# Patient Record
Sex: FEMALE | Race: WHITE | Employment: OTHER | URBAN - METROPOLITAN AREA
[De-identification: names, ages, dates, MRNs, and addresses within clinical notes are randomized per-mention and may not be internally consistent; named-entity substitution may affect disease eponyms.]

---

## 2021-07-15 ENCOUNTER — TRANSCRIBE ORDER (OUTPATIENT)
Dept: SCHEDULING | Age: 61
End: 2021-07-15

## 2021-07-15 DIAGNOSIS — S12.9XXA FRACTURE, CERVICAL VERTEBRA (HCC): Primary | ICD-10-CM

## 2021-07-15 DIAGNOSIS — S22.000A THORACIC COMPRESSION FRACTURE (HCC): Primary | ICD-10-CM

## 2021-07-15 DIAGNOSIS — S12.9XXA FRACTURE, CERVICAL VERTEBRA (HCC): ICD-10-CM

## 2021-07-25 ENCOUNTER — HOSPITAL ENCOUNTER (OUTPATIENT)
Dept: MRI IMAGING | Age: 61
Discharge: HOME | End: 2021-07-25
Attending: NEUROLOGICAL SURGERY
Payer: COMMERCIAL

## 2021-07-25 DIAGNOSIS — S22.000A THORACIC COMPRESSION FRACTURE (HCC): ICD-10-CM

## 2021-07-25 DIAGNOSIS — S12.9XXA FRACTURE, CERVICAL VERTEBRA (HCC): ICD-10-CM

## 2021-07-25 PROCEDURE — 72146 MRI CHEST SPINE W/O DYE: CPT

## 2021-07-25 PROCEDURE — 72141 MRI NECK SPINE W/O DYE: CPT

## 2021-09-07 ENCOUNTER — HOSPITAL ENCOUNTER (OUTPATIENT)
Dept: CT IMAGING | Age: 61
Discharge: HOME | End: 2021-09-07
Attending: NEUROLOGICAL SURGERY
Payer: COMMERCIAL

## 2021-09-07 DIAGNOSIS — S12.9XXA FRACTURE, CERVICAL VERTEBRA (HCC): ICD-10-CM

## 2021-09-07 PROCEDURE — 74011000636 HC RX REV CODE- 636: Performed by: NEUROLOGICAL SURGERY

## 2021-09-07 PROCEDURE — 70498 CT ANGIOGRAPHY NECK: CPT

## 2021-09-07 RX ADMIN — IOPAMIDOL 100 ML: 755 INJECTION, SOLUTION INTRAVENOUS at 15:32

## 2021-09-28 ENCOUNTER — TRANSCRIBE ORDER (OUTPATIENT)
Dept: SCHEDULING | Age: 61
End: 2021-09-28

## 2021-09-28 DIAGNOSIS — S12.9XXA CLOSED FRACTURE OF CERVICAL VERTEBRA WITHOUT SPINAL CORD INJURY (HCC): Primary | ICD-10-CM

## 2021-10-06 ENCOUNTER — HOSPITAL ENCOUNTER (OUTPATIENT)
Dept: CT IMAGING | Age: 61
Discharge: HOME | End: 2021-10-06
Attending: NEUROLOGICAL SURGERY

## 2021-10-06 DIAGNOSIS — S12.9XXA CLOSED FRACTURE OF CERVICAL VERTEBRA WITHOUT SPINAL CORD INJURY (HCC): ICD-10-CM

## 2021-10-06 PROCEDURE — 70496 CT ANGIOGRAPHY HEAD: CPT

## 2021-10-06 PROCEDURE — 74011000636 HC RX REV CODE- 636: Performed by: NEUROLOGICAL SURGERY

## 2021-10-06 RX ADMIN — IOPAMIDOL 100 ML: 755 INJECTION, SOLUTION INTRAVENOUS at 10:23

## 2021-10-08 ENCOUNTER — VIRTUAL VISIT (OUTPATIENT)
Dept: NEUROSURGERY | Age: 61
End: 2021-10-08
Payer: COMMERCIAL

## 2021-10-08 DIAGNOSIS — I67.1 CEREBRAL ANEURYSM: Primary | ICD-10-CM

## 2021-10-08 PROCEDURE — 99205 OFFICE O/P NEW HI 60 MIN: CPT | Performed by: RADIOLOGY

## 2021-10-08 RX ORDER — ATORVASTATIN CALCIUM 80 MG/1
80 TABLET, FILM COATED ORAL DAILY
COMMUNITY
Start: 2021-07-02

## 2021-10-08 RX ORDER — ASPIRIN 81 MG/1
81 TABLET ORAL DAILY
COMMUNITY
End: 2021-10-26 | Stop reason: DRUGHIGH

## 2021-10-08 RX ORDER — DULOXETIN HYDROCHLORIDE 20 MG/1
20 CAPSULE, DELAYED RELEASE ORAL DAILY
COMMUNITY
Start: 2021-08-14

## 2021-10-08 RX ORDER — PANTOPRAZOLE SODIUM 40 MG/1
40 TABLET, DELAYED RELEASE ORAL DAILY
COMMUNITY
Start: 2021-07-19

## 2021-10-08 RX ORDER — METOPROLOL SUCCINATE 25 MG/1
25 TABLET, EXTENDED RELEASE ORAL DAILY
COMMUNITY
Start: 2021-07-26
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
## 2021-10-08 NOTE — PATIENT INSTRUCTIONS
Learning About How Hospitals and Clinics Keep You Safe From COVID-19  Overview     Many hospitals and clinics now are treating people who are infected with COVID-19. So if you're in the hospital or clinic for any other reason, this may be an unsettling time. It's common to be concerned about becoming infected with the virus. But hospitals and clinics have policies to prevent the spread of infections. For example, doctors and nurses are trained to wash their hands before they treat you. Health care centers have stepped up these policies now. They are taking further steps to protect their patients. As long as HITPU-22 remains a public health problem, things are going to be different when you go to a health care facility. They may have new rules for your safety. These could include having you wear a cloth face cover, meeting you outside the clinic, and having you sit away from others in the waiting room. What are hospitals and clinics doing to keep you safe? Your care team is very aware of the threat of COVID-19. They are doing everything they can to keep you safe. Hospitals and clinics may have different policies. But in general, you may expect many of these measures:  · You will be screened for COVID-19. When you come to the hospital, you may have your temperature taken. You'll be asked about any symptoms, such as a fever, a cough, or shortness of breath. You may be asked if you've had contact with anyone who's been diagnosed with the virus. And you may be asked if you've traveled to any place that has had an outbreak. · The staff may try to do as much as possible outside the facility. For example, you may be asked to fill out paperwork online or in your car before you come inside. The person giving you a ride home may be asked to wait outside. These new rules to help protect you may make routine tasks take longer than usual.  · People who have COVID-19 are treated in a separate area.  Many hospitals and clinics have staff members who treat only these patients. This helps limit the spread of the infection. · Visitors may be limited. In some cases, no visitors are allowed. In others, only one healthy visitor is allowed. Children may be limited to having only one adult with them. You can connect with family and friends using your phone or computer. If you need something brought from home, such as glasses or a phone , find out where the item can be dropped off. · The hospital or clinic follows guidelines to prevent infection. These include:  ? Washing hands often. ? Disinfecting high-touch surfaces. ? Wearing face masks or other protective equipment. ? Making extra space for social distancing. What can you do to stay safe? We all have a role to play in keeping ourselves safe and preventing the spread of COVID-19. Here are some things you can do while you're receiving care. If you're in a hospital, stay in your room. This will limit your exposure to the virus. It may be boring, but it's the safest place for you. Wash your hands often. Use soap and water, and scrub for 20 seconds. Then rinse and dry them well. Always wash them after you use the bathroom, before you eat, and after you cough, sneeze, or blow your nose. If you can't wash your hands, use hand . Speak up if you have safety concerns. Don't be shy to correct health care workers if they aren't washing their hands properly, wearing face masks, or taking other precautions. These actions are vital to prevent the spread of infection. Try to be understanding. This is a stressful time for everyone, including your care team. They are doing their best to keep you safe and provide you with good care. Where can you learn more? Go to http://www.gray.com/  Enter A134 in the search box to learn more about \"Learning About How Hospitals and [facility] Safe From COVID-19. \"  Current as of: March 26, 2021               Content Version: 13.0  © 4481-2874 [employer]wise, [employer]. Care instructions adapted under license by [employer] (which disclaims liability or warranty for this information). If you have questions about a medical condition or this instruction, always ask your healthcare professional. [address] any warranty or liability for your use of this information.

## 2021-10-18 ENCOUNTER — PATIENT MESSAGE (OUTPATIENT)
Dept: NEUROSURGERY | Age: 61
End: 2021-10-18

## 2021-10-19 DIAGNOSIS — I67.1 CEREBRAL ANEURYSM: Primary | ICD-10-CM

## 2021-10-21 ENCOUNTER — TRANSCRIBE ORDER (OUTPATIENT)
Dept: REGISTRATION | Age: 61
End: 2021-10-21

## 2021-10-21 DIAGNOSIS — Z01.812 PRE-PROCEDURE LAB EXAM: Primary | ICD-10-CM

## 2021-10-26 ENCOUNTER — PATIENT MESSAGE (OUTPATIENT)
Dept: NEUROSURGERY | Age: 61
End: 2021-10-26

## 2021-10-26 DIAGNOSIS — I67.1 CEREBRAL ANEURYSM: Primary | ICD-10-CM

## 2021-10-26 RX ORDER — ASPIRIN 325 MG
325 TABLET ORAL DAILY
Qty: 30 TABLET | Refills: 5 | Status: SHIPPED | OUTPATIENT
Start: 2021-10-27 | End: 2022-04-18 | Stop reason: SDUPTHER

## 2021-10-26 RX ORDER — CLOPIDOGREL BISULFATE 75 MG/1
75 TABLET ORAL DAILY
Qty: 30 TABLET | Refills: 5 | Status: ON HOLD | OUTPATIENT
Start: 2021-10-27 | End: 2021-11-18 | Stop reason: SDUPTHER

## 2021-11-03 ENCOUNTER — HOSPITAL ENCOUNTER (OUTPATIENT)
Dept: PREADMISSION TESTING | Age: 61
Discharge: HOME | End: 2021-11-03
Payer: COMMERCIAL

## 2021-11-03 ENCOUNTER — HOSPITAL ENCOUNTER (OUTPATIENT)
Dept: GENERAL RADIOLOGY | Age: 61
Discharge: HOME | End: 2021-11-03
Attending: RADIOLOGY
Payer: COMMERCIAL

## 2021-11-03 ENCOUNTER — TELEPHONE (OUTPATIENT)
Dept: NEUROSURGERY | Age: 61
End: 2021-11-03

## 2021-11-03 VITALS
BODY MASS INDEX: 19.61 KG/M2 | DIASTOLIC BLOOD PRESSURE: 76 MMHG | WEIGHT: 114.86 LBS | HEART RATE: 69 BPM | HEIGHT: 64 IN | TEMPERATURE: 98.5 F | SYSTOLIC BLOOD PRESSURE: 131 MMHG | OXYGEN SATURATION: 100 %

## 2021-11-03 DIAGNOSIS — I67.1 CEREBRAL ANEURYSM: Primary | ICD-10-CM

## 2021-11-03 LAB
ABO + RH BLD: NORMAL
ALBUMIN SERPL-MCNC: 3.4 G/DL (ref 3.5–5)
ALBUMIN/GLOB SERPL: 1.1 {RATIO} (ref 1.1–2.2)
ALP SERPL-CCNC: 77 U/L (ref 45–117)
ALT SERPL-CCNC: 20 U/L (ref 12–78)
ANION GAP SERPL CALC-SCNC: 6 MMOL/L (ref 5–15)
ASPIRIN TEST, ASPIRN: 407 ARU
AST SERPL-CCNC: 14 U/L (ref 15–37)
BASOPHILS # BLD: 0.1 K/UL (ref 0–0.1)
BASOPHILS NFR BLD: 1 % (ref 0–1)
BILIRUB SERPL-MCNC: 0.5 MG/DL (ref 0.2–1)
BLOOD GROUP ANTIBODIES SERPL: NORMAL
BUN SERPL-MCNC: 7 MG/DL (ref 6–20)
BUN/CREAT SERPL: 10 (ref 12–20)
CALCIUM SERPL-MCNC: 9.1 MG/DL (ref 8.5–10.1)
CHLORIDE SERPL-SCNC: 107 MMOL/L (ref 97–108)
CO2 SERPL-SCNC: 27 MMOL/L (ref 21–32)
CREAT SERPL-MCNC: 0.68 MG/DL (ref 0.55–1.02)
DIFFERENTIAL METHOD BLD: ABNORMAL
EOSINOPHIL # BLD: 0.1 K/UL (ref 0–0.4)
EOSINOPHIL NFR BLD: 2 % (ref 0–7)
ERYTHROCYTE [DISTWIDTH] IN BLOOD BY AUTOMATED COUNT: 16.6 % (ref 11.5–14.5)
GLOBULIN SER CALC-MCNC: 3.2 G/DL (ref 2–4)
GLUCOSE SERPL-MCNC: 92 MG/DL (ref 65–100)
HCT VFR BLD AUTO: 38 % (ref 35–47)
HGB BLD-MCNC: 12.4 G/DL (ref 11.5–16)
IMM GRANULOCYTES # BLD AUTO: 0 K/UL (ref 0–0.04)
IMM GRANULOCYTES NFR BLD AUTO: 0 % (ref 0–0.5)
LYMPHOCYTES # BLD: 1.7 K/UL (ref 0.8–3.5)
LYMPHOCYTES NFR BLD: 24 % (ref 12–49)
MCH RBC QN AUTO: 29.3 PG (ref 26–34)
MCHC RBC AUTO-ENTMCNC: 32.6 G/DL (ref 30–36.5)
MCV RBC AUTO: 89.8 FL (ref 80–99)
MONOCYTES # BLD: 0.4 K/UL (ref 0–1)
MONOCYTES NFR BLD: 6 % (ref 5–13)
NEUTS SEG # BLD: 4.7 K/UL (ref 1.8–8)
NEUTS SEG NFR BLD: 67 % (ref 32–75)
NRBC # BLD: 0 K/UL (ref 0–0.01)
NRBC BLD-RTO: 0 PER 100 WBC
P2Y12 PLT RESPONSE,PPPR: 183 PRU (ref 194–418)
PLATELET # BLD AUTO: 365 K/UL (ref 150–400)
PMV BLD AUTO: 9.2 FL (ref 8.9–12.9)
POTASSIUM SERPL-SCNC: 3.7 MMOL/L (ref 3.5–5.1)
PROT SERPL-MCNC: 6.6 G/DL (ref 6.4–8.2)
RBC # BLD AUTO: 4.23 M/UL (ref 3.8–5.2)
SODIUM SERPL-SCNC: 140 MMOL/L (ref 136–145)
SPECIMEN EXP DATE BLD: NORMAL
WBC # BLD AUTO: 7 K/UL (ref 3.6–11)

## 2021-11-03 PROCEDURE — 71046 X-RAY EXAM CHEST 2 VIEWS: CPT

## 2021-11-03 PROCEDURE — 36415 COLL VENOUS BLD VENIPUNCTURE: CPT

## 2021-11-03 PROCEDURE — 80053 COMPREHEN METABOLIC PANEL: CPT

## 2021-11-03 PROCEDURE — 85576 BLOOD PLATELET AGGREGATION: CPT

## 2021-11-03 PROCEDURE — 93005 ELECTROCARDIOGRAM TRACING: CPT

## 2021-11-03 PROCEDURE — 85025 COMPLETE CBC W/AUTO DIFF WBC: CPT

## 2021-11-03 PROCEDURE — 86901 BLOOD TYPING SEROLOGIC RH(D): CPT

## 2021-11-03 NOTE — TELEPHONE ENCOUNTER
I received the result of patient's P2Y12 and aspirin tests. The aspirin test is therapeutic at 407 ARU, and the P2Y12 is borderline therapeutic at 183 PRU. I called the patient and advised her to Take Plavix 75 mg twice daily instead of once daily. We will recheck the P2Y12 test in 1 week. Patient expressed understanding and is in agreement with this plan.

## 2021-11-04 LAB
ATRIAL RATE: 60 BPM
CALCULATED P AXIS, ECG09: -10 DEGREES
CALCULATED R AXIS, ECG10: 67 DEGREES
CALCULATED T AXIS, ECG11: 11 DEGREES
DIAGNOSIS, 93000: NORMAL
P-R INTERVAL, ECG05: 124 MS
Q-T INTERVAL, ECG07: 440 MS
QRS DURATION, ECG06: 102 MS
QTC CALCULATION (BEZET), ECG08: 440 MS
VENTRICULAR RATE, ECG03: 60 BPM

## 2021-11-04 NOTE — PERIOP NOTES
BILLY Nurse Practitioner     Pre-Operative Chart Review/Assessment:    NEURO INTERVENTIONAL SURGERY                 Patient Name:  Cleo Bob                    MRN:   [id number]     Sex:   female                                       Age:   62 y.o.    :  1960       Today's Date:  2021     Date of PAT:   11/3/2021      Date of Surgery:    2021      Procedure(s):   Stent Assisted Coil Embolization     Surgeon:  Dr. Catie Aleman                    PLAN:       1)  PCP: Dr. Lia Edwards        2)  Clearances Required: Pt followed by Dr. Main([facility]). LOV 10/19/21. Condition stable. No new symptoms, changes to current regimen or pending testing. OV note and EKG faxed to [facility] office for scanning. EKG from [facility]. 3)  Sleep Apnea evaluation:  Not indicated.  MATTHEW Score 2.       4)  Additional Concerns: Current 0.5 PPD smoker, daily marijuana use for sleep aid, HTN, GERD, CAD s/p stent        5) Contrast Allergy: No       6) ASA: 407 on 11/3/21       7) P2Y12: 183 on 11/3/21    *ASA/P2Y12 routed to Johanna Bonner in [address] office on 21 @ 1010*                 Vital Signs:         Vitals:    21 1414   BP: 131/76   Pulse: 69   Temp: 98.5 °F (36.9 °C)   SpO2: 100%   Weight: 52.1 kg (114 lb 13.8 oz)   Height: 5' 4\" (1.626 m)   LMP: 10/25/2021            ____________________________________________  PAST MEDICAL HISTORY  Past Medical History:   Diagnosis Date    Aneurysm ([address]     CAD (coronary artery disease)     GERD (gastroesophageal reflux disease)     Hypertension       ____________________________________________  PAST SURGICAL HISTORY  Past Surgical History:   Procedure Laterality Date    HX APPENDECTOMY      HX HERNIA REPAIR      HX HIP REPLACEMENT Left     HX TONSILLECTOMY      HX TUBAL LIGATION      [place] CARDIAC SURG PROCEDURE UNLIST  2019    stent      ____________________________________________  HOME MEDICATIONS  Current Outpatient Medications   Medication Sig    clopidogreL (Plavix) 75 mg tab Take 1 Tablet by mouth daily.  aspirin (ASPIRIN) 325 mg tablet Take 1 Tablet by mouth daily.  pantoprazole (PROTONIX) 40 mg tablet Take 40 mg by mouth daily.  metoprolol succinate (TOPROL-XL) 25 mg XL tablet Take 25 mg by mouth daily.  DULoxetine (CYMBALTA) 20 mg capsule Take 20 mg by mouth daily.  atorvastatin (LIPITOR) 80 mg tablet Take 80 mg by mouth daily. No current facility-administered medications for this encounter.      ____________________________________________  ALLERGIES  Allergies   Allergen Reactions    Latex Hives     AND BLISTERS    Pcn [Penicillins] Unknown (comments)     When taking penicillin did you have any of the following:      Patient denies any of the following reactions from taking penicillin. No significant reaction to PCN    NO HOSPITALIZATION REQUIRED AS A CHILD.            ____________________________________________  SOCIAL HISTORY  Social History     Tobacco Use    Smoking status: Current Every Day Smoker     Packs/day: 0.50     Years: 25.00     Pack years: 12.50    Smokeless tobacco: Never Used   Substance Use Topics    Alcohol use: Yes     Alcohol/week: 2.0 standard drinks     Types: 2 Shots of liquor per week      ___________________________________________   Internal Administration   First Dose COVID-19, J&J, PF, 0.5 mL Dose  04/01/2021   Second Dose         Last COVID Lab SARS-CoV-2 ( )   Date Value   11/12/2021 Not detected      ____________________________________________    Labs:    Hospital Outpatient Visit on 11/03/2021   Component Date Value Ref Range Status    Aspirin test 11/03/2021 407  ARU Final    Comment: (NOTE)   The aspirin inhibitory effect on platelet aggregation is   demonstrated by a decrease in the VerifyNow Aspirin result compared   to the baseline value obtained prior to initiation of aspirin   therapy.                                                                 The nonmedicated reference range of > or = 550 ARU(Aspirin Reactive   Units) indicates absence of aspirin inhibition. Following consumption of aspirin, VerifyNow Aspirin test results   <550 ARU are associated with expected antiplatelet effect. Other platelet inhibiters, NSAIDS and low platelet counts may   interfere with the results.  WBC 11/03/2021 7.0  3.6 - 11.0 K/uL Final    RBC 11/03/2021 4.23  3.80 - 5.20 M/uL Final    HGB 11/03/2021 12.4  11.5 - 16.0 g/dL Final    HCT 11/03/2021 38.0  35.0 - 47.0 % Final    MCV 11/03/2021 89.8  80.0 - 99.0 FL Final    MCH 11/03/2021 29.3  26.0 - 34.0 PG Final    MCHC 11/03/2021 32.6  30.0 - 36.5 g/dL Final    RDW 11/03/2021 16.6* 11.5 - 14.5 % Final    PLATELET 52/96/5554 596  150 - 400 K/uL Final    MPV 11/03/2021 9.2  8.9 - 12.9 FL Final    NRBC 11/03/2021 0.0  0  WBC Final    ABSOLUTE NRBC 11/03/2021 0.00  0.00 - 0.01 K/uL Final    NEUTROPHILS 11/03/2021 67  32 - 75 % Final    LYMPHOCYTES 11/03/2021 24  12 - 49 % Final    MONOCYTES 11/03/2021 6  5 - 13 % Final    EOSINOPHILS 11/03/2021 2  0 - 7 % Final    BASOPHILS 11/03/2021 1  0 - 1 % Final    IMMATURE GRANULOCYTES 11/03/2021 0  0.0 - 0.5 % Final    ABS. NEUTROPHILS 11/03/2021 4.7  1.8 - 8.0 K/UL Final    ABS. LYMPHOCYTES 11/03/2021 1.7  0.8 - 3.5 K/UL Final    ABS. MONOCYTES 11/03/2021 0.4  0.0 - 1.0 K/UL Final    ABS. EOSINOPHILS 11/03/2021 0.1  0.0 - 0.4 K/UL Final    ABS. BASOPHILS 11/03/2021 0.1  0.0 - 0.1 K/UL Final    ABS. IMM. GRANS.  11/03/2021 0.0  0.00 - 0.04 K/UL Final    DF 11/03/2021 AUTOMATED    Final    Ventricular Rate 11/03/2021 60  BPM Preliminary    Atrial Rate 11/03/2021 60  BPM Preliminary    P-R Interval 11/03/2021 124  ms Preliminary    QRS Duration 11/03/2021 102  ms Preliminary    Q-T Interval 11/03/2021 440  ms Preliminary    QTC Calculation (Bezet) 11/03/2021 440  ms Preliminary    Calculated P Axis 11/03/2021 -10  degrees Preliminary    Calculated R Axis 11/03/2021 67  degrees Preliminary    Calculated T Axis 11/03/2021 11  degrees Preliminary    Diagnosis 11/03/2021    Preliminary                    Value:Normal sinus rhythm  Nonspecific ST abnormality  Abnormal ECG  No previous ECGs available      P2Y12 Plt response 11/03/2021 183* 194 - 418 PRU Final    Comment: (NOTE)  The clopidogrel inhibitory effect on P2Y12 receptors is demonstrated   by a decrease in the VerifyNow P2Y12 result compared to the baseline   value obtained prior to initiation of clopidogrel. The nonmedicated   reference range of 194-418 PRU (P2Y12 Reaction Units) indicates the   absence of P2Y12 inhibition. Following consumption of clopidogrel,   VerifyNow P2Y12 test results less than 194 are associated with   expected antiplatelet effect. Of note, results can be affected by   GPIIa IIIb inhibitors, inherited platelet disorders, low hematocrit   and or low platelet counts.  Crossmatch Expiration 11/03/2021 11/06/2021,2359   Final    ABO/Rh(D) 11/03/2021 A POSITIVE   Final    Antibody screen 11/03/2021 NEG   Final    Sodium 11/03/2021 140  136 - 145 mmol/L Final    Potassium 11/03/2021 3.7  3.5 - 5.1 mmol/L Final    Chloride 11/03/2021 107  97 - 108 mmol/L Final    CO2 11/03/2021 27  21 - 32 mmol/L Final    Anion gap 11/03/2021 6  5 - 15 mmol/L Final    Glucose 11/03/2021 92  65 - 100 mg/dL Final    BUN 11/03/2021 7  6 - 20 MG/DL Final    Creatinine 11/03/2021 0.68  0.55 - 1.02 MG/DL Final    BUN/Creatinine ratio 11/03/2021 10* 12 - 20   Final    GFR est AA 11/03/2021 >60  >60 ml/min/1.73m2 Final    GFR est non-AA 11/03/2021 >60  >60 ml/min/1.73m2 Final    Estimated GFR is calculated using the IDMS-traceable Modification of Diet in Renal Disease (MDRD) Study equation, reported for both  Americans (GFRAA) and non- Americans (GFRNA), and normalized to 1.73m2 body surface area. The physician must decide which value applies to the patient.     Calcium 11/03/2021 9.1  8.5 - 10.1 MG/DL Final    Bilirubin, total 11/03/2021 0.5  0.2 - 1.0 MG/DL Final    ALT (SGPT) 11/03/2021 20  12 - 78 U/L Final    AST (SGOT) 11/03/2021 14* 15 - 37 U/L Final    Alk. phosphatase 11/03/2021 77  45 - 117 U/L Final    Protein, total 11/03/2021 6.6  6.4 - 8.2 g/dL Final    Albumin 11/03/2021 3.4* 3.5 - 5.0 g/dL Final    Globulin 11/03/2021 3.2  2.0 - 4.0 g/dL Final    A-G Ratio 11/03/2021 1.1  1.1 - 2.2   Final       XR Results (most recent):  Results from Hospital Encounter encounter on 11/03/21    XR CHEST PA LAT    Narrative  EXAM: XR CHEST PA LAT    INDICATION: Preoperative examination prior to brain surgery. Coronary artery  disease, previous tobacco use, and Hypertension. COMPARISON: MRI thoracic spine on 7/25/2021    TECHNIQUE: PA and lateral chest digital view    FINDINGS: The cardiomediastinal and hilar contours are within normal limits. The  pulmonary vasculature is within normal limits. The lungs and pleural spaces are clear. Bones are osteopenic. T6 vertebral body  is partially compressed. No displaced rib fracture. Impression  Normal PA and lateral chest views. Unchanged T6 nonacute partial compression fracture. Skin:     Denies open wounds, cuts, sores, rashes or other areas of concern in PAT assessment.          Charis Schmidt NP

## 2021-11-08 ENCOUNTER — HOSPITAL ENCOUNTER (OUTPATIENT)
Dept: LAB | Age: 61
Discharge: HOME | End: 2021-11-08

## 2021-11-08 DIAGNOSIS — I67.1 CEREBRAL ANEURYSM: ICD-10-CM

## 2021-11-08 LAB
COMMENT, HOLDF: NORMAL
P2Y12 PLT RESPONSE,PPPR: 114 PRU (ref 194–418)
SAMPLES BEING HELD,HOLD: NORMAL

## 2021-11-09 ENCOUNTER — PATIENT MESSAGE (OUTPATIENT)
Dept: NEUROSURGERY | Age: 61
End: 2021-11-09

## 2021-11-09 NOTE — TELEPHONE ENCOUNTER
----- Message from Kristi Sepulveda MD sent at 11/8/2021  4:13 PM EST -----  Hi Terri Rodriguez,  Please call patient and let her know that her repeat platelet function test is perfect. She should CONTINUE taking plavix 75 mg TWICE daily and aspirin daily.  Thanks.  ----- Message -----  From: [facility] [place]  Sent: 11/8/2021   1:42 PM EST  To: Kristi Sepulveda MD

## 2021-11-09 NOTE — TELEPHONE ENCOUNTER
Message left for patient regarding lab results. Continue current dose as prescribed per provider. Message also sent via [employer].

## 2021-11-12 ENCOUNTER — HOSPITAL ENCOUNTER (OUTPATIENT)
Dept: PREADMISSION TESTING | Age: 61
Discharge: HOME | End: 2021-11-12
Attending: RADIOLOGY
Payer: COMMERCIAL

## 2021-11-12 DIAGNOSIS — Z01.812 PRE-PROCEDURE LAB EXAM: ICD-10-CM

## 2021-11-12 PROCEDURE — U0005 INFEC AGEN DETEC AMPLI PROBE: HCPCS

## 2021-11-13 LAB
SARS-COV-2, XPLCVT: NOT DETECTED
SOURCE, COVRS: NORMAL

## 2021-11-16 ENCOUNTER — DOCUMENTATION ONLY (OUTPATIENT)
Dept: NEUROSURGERY | Age: 61
End: 2021-11-16

## 2021-11-16 ENCOUNTER — TELEPHONE (OUTPATIENT)
Dept: NEUROSURGERY | Age: 61
End: 2021-11-16

## 2021-11-16 NOTE — TELEPHONE ENCOUNTER
Spoke to patient to confirm Stent assisted coil embolization tomorrow at [facility]. Arrival time 7:00 am at patient registration. Informed patient no eating or drinking after midnight and take morning medications with sips of water. Patient stated understanding.

## 2021-11-16 NOTE — PROGRESS NOTES
Procedure: Cerebral angiogram with stent-assisted coil embolization of Left MCA aneurysm  Sedation: General anesthesia  Access: Right CFA    Lab testing: None required (P2Y12 on 11/8/21 therapeutic at 114 PRU, Aspirin test on 11/3/21 therapeutic at 407 ARU, Cr & CBC normal on 11/3/21)  Medications: Patient should have taken normal home morning meds with small sips of water this morning (including Plavix 75 mg and aspirin 325 mg). Notes:  Patient has history of CAD and inferior STEMI in 2018, s/p PCI or RCA. EF 45-50% in 2019. EKG 11/3/21 showed NSR and nonspecific ST abnormality.

## 2021-11-17 ENCOUNTER — ANESTHESIA (OUTPATIENT)
Dept: INTERVENTIONAL RADIOLOGY/VASCULAR | Age: 61
DRG: 027 | End: 2021-11-17
Payer: COMMERCIAL

## 2021-11-17 ENCOUNTER — HOSPITAL ENCOUNTER (INPATIENT)
Dept: INTERVENTIONAL RADIOLOGY/VASCULAR | Age: 61
LOS: 1 days | Discharge: HOME | DRG: 027 | End: 2021-11-18
Attending: RADIOLOGY | Admitting: RADIOLOGY
Payer: COMMERCIAL

## 2021-11-17 ENCOUNTER — ANESTHESIA EVENT (OUTPATIENT)
Dept: INTERVENTIONAL RADIOLOGY/VASCULAR | Age: 61
DRG: 027 | End: 2021-11-17
Payer: COMMERCIAL

## 2021-11-17 DIAGNOSIS — I67.1 CEREBRAL ANEURYSM: ICD-10-CM

## 2021-11-17 LAB
ACT BLD: 125 SECS (ref 79–138)
ACT BLD: 197 SECS (ref 79–138)
ACT BLD: 230 SECS (ref 79–138)
ASPIRIN TEST, ASPIRN: 419 ARU
P2Y12 PLT RESPONSE,PPPR: 139 PRU (ref 194–418)
P2Y12 PLT RESPONSE,PPPR: 245 PRU (ref 194–418)

## 2021-11-17 PROCEDURE — C1887 CATHETER, GUIDING: HCPCS

## 2021-11-17 PROCEDURE — 85347 COAGULATION TIME ACTIVATED: CPT

## 2021-11-17 PROCEDURE — G0269 OCCLUSIVE DEVICE IN VEIN ART: HCPCS

## 2021-11-17 PROCEDURE — C1769 GUIDE WIRE: HCPCS

## 2021-11-17 PROCEDURE — 36224 PLACE CATH CAROTD ART: CPT

## 2021-11-17 PROCEDURE — 77030026438 HC STYL ET INTUB CARD -A: Performed by: ANESTHESIOLOGY

## 2021-11-17 PROCEDURE — 74011000250 HC RX REV CODE- 250: Performed by: NURSE PRACTITIONER

## 2021-11-17 PROCEDURE — 77030016715 HC CATH ANGI DX TMPO2 CARD -B

## 2021-11-17 PROCEDURE — 77030013797 HC KT TRNSDUC PRSSR EDWD -A

## 2021-11-17 PROCEDURE — 74011250637 HC RX REV CODE- 250/637: Performed by: RADIOLOGY

## 2021-11-17 PROCEDURE — B3181ZZ FLUOROSCOPY OF BILATERAL INTERNAL CAROTID ARTERIES USING LOW OSMOLAR CONTRAST: ICD-10-PCS | Performed by: RADIOLOGY

## 2021-11-17 PROCEDURE — C1889 IMPLANT/INSERT DEVICE, NOC: HCPCS

## 2021-11-17 PROCEDURE — 77030035304 HC CATH ANGI BENCHMARK PENU -G

## 2021-11-17 PROCEDURE — 03HY32Z INSERTION OF MONITORING DEVICE INTO UPPER ARTERY, PERCUTANEOUS APPROACH: ICD-10-PCS | Performed by: ANESTHESIOLOGY

## 2021-11-17 PROCEDURE — 74011000636 HC RX REV CODE- 636: Performed by: RADIOLOGY

## 2021-11-17 PROCEDURE — C1894 INTRO/SHEATH, NON-LASER: HCPCS

## 2021-11-17 PROCEDURE — 74011000250 HC RX REV CODE- 250: Performed by: NURSE ANESTHETIST, CERTIFIED REGISTERED

## 2021-11-17 PROCEDURE — 85576 BLOOD PLATELET AGGREGATION: CPT

## 2021-11-17 PROCEDURE — 77030012468 HC VLV BLEEDBK CNTRL ABBT -B

## 2021-11-17 PROCEDURE — 74011250636 HC RX REV CODE- 250/636: Performed by: NURSE ANESTHETIST, CERTIFIED REGISTERED

## 2021-11-17 PROCEDURE — 77030005402 HC CATH RAD ART LN KT TELE -B

## 2021-11-17 PROCEDURE — 77030020269 HC MISC IMPL

## 2021-11-17 PROCEDURE — 77030038563 HC TU ART PRESSR ICUM -Z

## 2021-11-17 PROCEDURE — 77030029288 HC HNDL INZONE DTCH STRY -C

## 2021-11-17 PROCEDURE — 74011000250 HC RX REV CODE- 250: Performed by: RADIOLOGY

## 2021-11-17 PROCEDURE — 36415 COLL VENOUS BLD VENIPUNCTURE: CPT

## 2021-11-17 PROCEDURE — 2709999900 HC NON-CHARGEABLE SUPPLY

## 2021-11-17 PROCEDURE — 77030008684 HC TU ET CUF COVD -B: Performed by: ANESTHESIOLOGY

## 2021-11-17 PROCEDURE — 74011250636 HC RX REV CODE- 250/636: Performed by: RADIOLOGY

## 2021-11-17 PROCEDURE — 74011250636 HC RX REV CODE- 250/636: Performed by: NURSE PRACTITIONER

## 2021-11-17 PROCEDURE — 03LG3DZ OCCLUSION OF INTRACRANIAL ARTERY WITH INTRALUMINAL DEVICE, PERCUTANEOUS APPROACH: ICD-10-PCS | Performed by: RADIOLOGY

## 2021-11-17 PROCEDURE — 76060000037 HC ANESTHESIA 3 TO 3.5 HR

## 2021-11-17 PROCEDURE — 74011250637 HC RX REV CODE- 250/637: Performed by: NURSE PRACTITIONER

## 2021-11-17 PROCEDURE — 77030041031 HC CTRLR DETACH SYS MICV -C

## 2021-11-17 PROCEDURE — C1760 CLOSURE DEV, VASC: HCPCS

## 2021-11-17 PROCEDURE — 65610000006 HC RM INTENSIVE CARE

## 2021-11-17 RX ORDER — NEOSTIGMINE METHYLSULFATE 1 MG/ML
INJECTION, SOLUTION INTRAVENOUS AS NEEDED
Status: DISCONTINUED | OUTPATIENT
Start: 2021-11-17 | End: 2021-11-17 | Stop reason: HOSPADM

## 2021-11-17 RX ORDER — METOPROLOL SUCCINATE 25 MG/1
25 TABLET, EXTENDED RELEASE ORAL DAILY
Status: DISCONTINUED | OUTPATIENT
Start: 2021-11-18 | End: 2021-11-18 | Stop reason: HOSPADM

## 2021-11-17 RX ORDER — MIDAZOLAM HYDROCHLORIDE 1 MG/ML
INJECTION, SOLUTION INTRAMUSCULAR; INTRAVENOUS
Status: COMPLETED
Start: 2021-11-17 | End: 2021-11-17

## 2021-11-17 RX ORDER — ACETAMINOPHEN 325 MG/1
650 TABLET ORAL
Status: DISCONTINUED | OUTPATIENT
Start: 2021-11-17 | End: 2021-11-18 | Stop reason: HOSPADM

## 2021-11-17 RX ORDER — OXYCODONE HYDROCHLORIDE 5 MG/1
5 TABLET ORAL
Status: DISCONTINUED | OUTPATIENT
Start: 2021-11-17 | End: 2021-11-18

## 2021-11-17 RX ORDER — ROCURONIUM BROMIDE 10 MG/ML
INJECTION, SOLUTION INTRAVENOUS AS NEEDED
Status: DISCONTINUED | OUTPATIENT
Start: 2021-11-17 | End: 2021-11-17 | Stop reason: HOSPADM

## 2021-11-17 RX ORDER — MIDAZOLAM HYDROCHLORIDE 1 MG/ML
INJECTION, SOLUTION INTRAMUSCULAR; INTRAVENOUS AS NEEDED
Status: DISCONTINUED | OUTPATIENT
Start: 2021-11-17 | End: 2021-11-17 | Stop reason: HOSPADM

## 2021-11-17 RX ORDER — LIDOCAINE HYDROCHLORIDE 20 MG/ML
20 INJECTION, SOLUTION INFILTRATION; PERINEURAL
Status: COMPLETED | OUTPATIENT
Start: 2021-11-17 | End: 2021-11-17

## 2021-11-17 RX ORDER — CLOPIDOGREL BISULFATE 75 MG/1
75 TABLET ORAL DAILY
Status: DISCONTINUED | OUTPATIENT
Start: 2021-11-17 | End: 2021-11-17

## 2021-11-17 RX ORDER — GLYCOPYRROLATE 0.2 MG/ML
INJECTION INTRAMUSCULAR; INTRAVENOUS AS NEEDED
Status: DISCONTINUED | OUTPATIENT
Start: 2021-11-17 | End: 2021-11-17 | Stop reason: HOSPADM

## 2021-11-17 RX ORDER — SODIUM CHLORIDE, SODIUM LACTATE, POTASSIUM CHLORIDE, CALCIUM CHLORIDE 600; 310; 30; 20 MG/100ML; MG/100ML; MG/100ML; MG/100ML
INJECTION, SOLUTION INTRAVENOUS
Status: DISCONTINUED | OUTPATIENT
Start: 2021-11-17 | End: 2021-11-17 | Stop reason: HOSPADM

## 2021-11-17 RX ORDER — ASPIRIN 325 MG
325 TABLET ORAL DAILY
Status: DISCONTINUED | OUTPATIENT
Start: 2021-11-17 | End: 2021-11-17

## 2021-11-17 RX ORDER — PANTOPRAZOLE SODIUM 40 MG/1
40 TABLET, DELAYED RELEASE ORAL DAILY
Status: DISCONTINUED | OUTPATIENT
Start: 2021-11-17 | End: 2021-11-17

## 2021-11-17 RX ORDER — PANTOPRAZOLE SODIUM 40 MG/1
40 TABLET, DELAYED RELEASE ORAL DAILY
Status: DISCONTINUED | OUTPATIENT
Start: 2021-11-18 | End: 2021-11-18 | Stop reason: HOSPADM

## 2021-11-17 RX ORDER — LIDOCAINE HYDROCHLORIDE 20 MG/ML
INJECTION, SOLUTION EPIDURAL; INFILTRATION; INTRACAUDAL; PERINEURAL AS NEEDED
Status: DISCONTINUED | OUTPATIENT
Start: 2021-11-17 | End: 2021-11-17 | Stop reason: HOSPADM

## 2021-11-17 RX ORDER — CLOPIDOGREL BISULFATE 75 MG/1
75 TABLET ORAL 2 TIMES DAILY
Status: DISCONTINUED | OUTPATIENT
Start: 2021-11-17 | End: 2021-11-18 | Stop reason: HOSPADM

## 2021-11-17 RX ORDER — SUCCINYLCHOLINE CHLORIDE 20 MG/ML
INJECTION INTRAMUSCULAR; INTRAVENOUS AS NEEDED
Status: DISCONTINUED | OUTPATIENT
Start: 2021-11-17 | End: 2021-11-17 | Stop reason: HOSPADM

## 2021-11-17 RX ORDER — CLOPIDOGREL BISULFATE 75 MG/1
150 TABLET ORAL
Status: COMPLETED | OUTPATIENT
Start: 2021-11-17 | End: 2021-11-17

## 2021-11-17 RX ORDER — SODIUM CHLORIDE, SODIUM LACTATE, POTASSIUM CHLORIDE, CALCIUM CHLORIDE 600; 310; 30; 20 MG/100ML; MG/100ML; MG/100ML; MG/100ML
50 INJECTION, SOLUTION INTRAVENOUS CONTINUOUS
Status: DISPENSED | OUTPATIENT
Start: 2021-11-17 | End: 2021-11-18

## 2021-11-17 RX ORDER — ASPIRIN 325 MG
325 TABLET ORAL DAILY
Status: DISCONTINUED | OUTPATIENT
Start: 2021-11-18 | End: 2021-11-18 | Stop reason: HOSPADM

## 2021-11-17 RX ORDER — HEPARIN SODIUM 1000 [USP'U]/ML
INJECTION, SOLUTION INTRAVENOUS; SUBCUTANEOUS AS NEEDED
Status: DISCONTINUED | OUTPATIENT
Start: 2021-11-17 | End: 2021-11-17 | Stop reason: HOSPADM

## 2021-11-17 RX ORDER — DULOXETIN HYDROCHLORIDE 20 MG/1
20 CAPSULE, DELAYED RELEASE ORAL DAILY
Status: DISCONTINUED | OUTPATIENT
Start: 2021-11-18 | End: 2021-11-18 | Stop reason: HOSPADM

## 2021-11-17 RX ORDER — ATORVASTATIN CALCIUM 40 MG/1
80 TABLET, FILM COATED ORAL DAILY
Status: DISCONTINUED | OUTPATIENT
Start: 2021-11-18 | End: 2021-11-18 | Stop reason: HOSPADM

## 2021-11-17 RX ORDER — ONDANSETRON 2 MG/ML
INJECTION INTRAMUSCULAR; INTRAVENOUS AS NEEDED
Status: DISCONTINUED | OUTPATIENT
Start: 2021-11-17 | End: 2021-11-17 | Stop reason: HOSPADM

## 2021-11-17 RX ORDER — PROPOFOL 10 MG/ML
INJECTION, EMULSION INTRAVENOUS AS NEEDED
Status: DISCONTINUED | OUTPATIENT
Start: 2021-11-17 | End: 2021-11-17 | Stop reason: HOSPADM

## 2021-11-17 RX ORDER — FENTANYL CITRATE 50 UG/ML
INJECTION, SOLUTION INTRAMUSCULAR; INTRAVENOUS AS NEEDED
Status: DISCONTINUED | OUTPATIENT
Start: 2021-11-17 | End: 2021-11-17 | Stop reason: HOSPADM

## 2021-11-17 RX ORDER — METOPROLOL SUCCINATE 25 MG/1
25 TABLET, EXTENDED RELEASE ORAL DAILY
Status: DISCONTINUED | OUTPATIENT
Start: 2021-11-17 | End: 2021-11-17

## 2021-11-17 RX ORDER — ATORVASTATIN CALCIUM 40 MG/1
80 TABLET, FILM COATED ORAL DAILY
Status: DISCONTINUED | OUTPATIENT
Start: 2021-11-17 | End: 2021-11-17

## 2021-11-17 RX ORDER — FENTANYL CITRATE 50 UG/ML
INJECTION, SOLUTION INTRAMUSCULAR; INTRAVENOUS
Status: COMPLETED
Start: 2021-11-17 | End: 2021-11-17

## 2021-11-17 RX ORDER — DULOXETIN HYDROCHLORIDE 20 MG/1
20 CAPSULE, DELAYED RELEASE ORAL DAILY
Status: DISCONTINUED | OUTPATIENT
Start: 2021-11-17 | End: 2021-11-17

## 2021-11-17 RX ADMIN — SODIUM CHLORIDE, POTASSIUM CHLORIDE, SODIUM LACTATE AND CALCIUM CHLORIDE 100 ML/HR: 600; 310; 30; 20 INJECTION, SOLUTION INTRAVENOUS at 11:52

## 2021-11-17 RX ADMIN — SODIUM CHLORIDE, POTASSIUM CHLORIDE, SODIUM LACTATE AND CALCIUM CHLORIDE: 600; 310; 30; 20 INJECTION, SOLUTION INTRAVENOUS at 08:34

## 2021-11-17 RX ADMIN — PHENYLEPHRINE HYDROCHLORIDE 10 MCG/MIN: 10 INJECTION INTRAVENOUS at 08:54

## 2021-11-17 RX ADMIN — ACETAMINOPHEN 650 MG: 325 TABLET ORAL at 13:40

## 2021-11-17 RX ADMIN — HEPARIN SODIUM 4000 UNITS: 5000 INJECTION, SOLUTION INTRAVENOUS; SUBCUTANEOUS at 08:11

## 2021-11-17 RX ADMIN — IOPAMIDOL 129 ML: 612 INJECTION, SOLUTION INTRAVENOUS at 11:03

## 2021-11-17 RX ADMIN — OXYCODONE 5 MG: 5 TABLET ORAL at 13:57

## 2021-11-17 RX ADMIN — CLOPIDOGREL BISULFATE 75 MG: 75 TABLET ORAL at 17:07

## 2021-11-17 RX ADMIN — NEOSTIGMINE METHYLSULFATE 3 MG: 1 INJECTION, SOLUTION INTRAVENOUS at 11:05

## 2021-11-17 RX ADMIN — PROPOFOL 120 MG: 10 INJECTION, EMULSION INTRAVENOUS at 08:37

## 2021-11-17 RX ADMIN — GLYCOPYRROLATE 0.1 MG: 0.2 INJECTION INTRAMUSCULAR; INTRAVENOUS at 10:03

## 2021-11-17 RX ADMIN — ONDANSETRON HYDROCHLORIDE 4 MG: 2 INJECTION, SOLUTION INTRAMUSCULAR; INTRAVENOUS at 11:05

## 2021-11-17 RX ADMIN — SUCCINYLCHOLINE CHLORIDE 120 MG: 20 INJECTION, SOLUTION INTRAMUSCULAR; INTRAVENOUS at 08:37

## 2021-11-17 RX ADMIN — SODIUM CHLORIDE, POTASSIUM CHLORIDE, SODIUM LACTATE AND CALCIUM CHLORIDE 100 ML/HR: 600; 310; 30; 20 INJECTION, SOLUTION INTRAVENOUS at 20:27

## 2021-11-17 RX ADMIN — HEPARIN SODIUM 4000 UNITS: 5000 INJECTION, SOLUTION INTRAVENOUS; SUBCUTANEOUS at 08:19

## 2021-11-17 RX ADMIN — HEPARIN SODIUM 4000 UNITS: 5000 INJECTION, SOLUTION INTRAVENOUS; SUBCUTANEOUS at 08:17

## 2021-11-17 RX ADMIN — PROPOFOL 30 MG: 10 INJECTION, EMULSION INTRAVENOUS at 08:43

## 2021-11-17 RX ADMIN — ROCURONIUM BROMIDE 20 MG: 10 INJECTION INTRAVENOUS at 09:15

## 2021-11-17 RX ADMIN — ROCURONIUM BROMIDE 10 MG: 10 INJECTION INTRAVENOUS at 08:37

## 2021-11-17 RX ADMIN — CLOPIDOGREL BISULFATE 150 MG: 75 TABLET ORAL at 13:39

## 2021-11-17 RX ADMIN — SODIUM CHLORIDE, POTASSIUM CHLORIDE, SODIUM LACTATE AND CALCIUM CHLORIDE: 600; 310; 30; 20 INJECTION, SOLUTION INTRAVENOUS at 09:15

## 2021-11-17 RX ADMIN — HEPARIN SODIUM 5000 UNITS: 1000 INJECTION, SOLUTION INTRAVENOUS; SUBCUTANEOUS at 09:53

## 2021-11-17 RX ADMIN — NICARDIPINE HYDROCHLORIDE 5 MG/HR: 25 INJECTION, SOLUTION INTRAVENOUS at 11:54

## 2021-11-17 RX ADMIN — ROCURONIUM BROMIDE 10 MG: 10 INJECTION INTRAVENOUS at 10:27

## 2021-11-17 RX ADMIN — FENTANYL CITRATE 50 MCG: 50 INJECTION, SOLUTION INTRAMUSCULAR; INTRAVENOUS at 08:37

## 2021-11-17 RX ADMIN — ROCURONIUM BROMIDE 10 MG: 10 INJECTION INTRAVENOUS at 09:50

## 2021-11-17 RX ADMIN — HEPARIN SODIUM 4000 UNITS: 5000 INJECTION, SOLUTION INTRAVENOUS; SUBCUTANEOUS at 08:18

## 2021-11-17 RX ADMIN — LIDOCAINE HYDROCHLORIDE 60 MG: 20 INJECTION, SOLUTION EPIDURAL; INFILTRATION; INTRACAUDAL; PERINEURAL at 08:37

## 2021-11-17 RX ADMIN — FENTANYL CITRATE 50 MCG: 50 INJECTION, SOLUTION INTRAMUSCULAR; INTRAVENOUS at 09:06

## 2021-11-17 RX ADMIN — LIDOCAINE HYDROCHLORIDE 20 MG: 20 INJECTION, SOLUTION INFILTRATION; PERINEURAL at 09:14

## 2021-11-17 RX ADMIN — ROCURONIUM BROMIDE 20 MG: 10 INJECTION INTRAVENOUS at 08:55

## 2021-11-17 RX ADMIN — HEPARIN SODIUM 4000 UNITS: 5000 INJECTION, SOLUTION INTRAVENOUS; SUBCUTANEOUS at 08:10

## 2021-11-17 RX ADMIN — GLYCOPYRROLATE 0.6 MG: 0.2 INJECTION INTRAMUSCULAR; INTRAVENOUS at 11:05

## 2021-11-17 RX ADMIN — HEPARIN SODIUM 4000 UNITS: 5000 INJECTION, SOLUTION INTRAVENOUS; SUBCUTANEOUS at 08:16

## 2021-11-17 RX ADMIN — OXYCODONE 5 MG: 5 TABLET ORAL at 22:59

## 2021-11-17 RX ADMIN — HEPARIN SODIUM 2000 UNITS: 1000 INJECTION, SOLUTION INTRAVENOUS; SUBCUTANEOUS at 10:06

## 2021-11-17 RX ADMIN — ACETAMINOPHEN 650 MG: 325 TABLET ORAL at 21:16

## 2021-11-17 RX ADMIN — GLYCOPYRROLATE 0.1 MG: 0.2 INJECTION INTRAMUSCULAR; INTRAVENOUS at 10:05

## 2021-11-17 RX ADMIN — MIDAZOLAM HYDROCHLORIDE 2 MG: 1 INJECTION, SOLUTION INTRAMUSCULAR; INTRAVENOUS at 08:34

## 2021-11-17 NOTE — ANESTHESIA POSTPROCEDURE EVALUATION
* No procedures listed *.    general    Anesthesia Post Evaluation        Patient participation: complete - patient participated  Level of consciousness: awake  Pain management: adequate  Airway patency: patent  Anesthetic complications: no  Cardiovascular status: hemodynamically stable  Respiratory status: acceptable  Hydration status: acceptable  Comments: The patient is ready for PACU discharge. Cady Madera DO                   Post anesthesia nausea and vomiting:  controlled      INITIAL Post-op Vital signs:   Vitals Value Taken Time   /66 11/17/21 1201   Temp 35.9 °C (96.6 °F) 11/17/21 1200   Pulse 60 11/17/21 1212   Resp 9 11/17/21 1212   SpO2 100 % 11/17/21 1212   Vitals shown include unvalidated device data.

## 2021-11-17 NOTE — BRIEF OP NOTE
NEUROINTERVENTIONAL SURGERY POST-PROCEDURE NOTE    PROCEDURE:  Diagnostic cerebral angiogram  Transarterial embolization, CNS: Coil embolization of left MCA aneurysm  Placement of arteriotomy closure device    VESSEL(S) STUDIED:  1. Right ICA  2. Left vertebral artery  3. Left ICA VESSEL(S) TREATED:  1. Left MCA        PRELIMINARY REPORT & DISPOSITION:   5 mm left MCA bifurcation aneurysm noted. Status post stent-assisted coil embolization of left MCA aneurysm using a 3 x 21 mm [place] stent extending from the left MCA inferior division to the left M1 segment and 8 detachable platinum coils, resulting in near-complete aneurysm occlusion. COMPLICATIONS:  None    FOLLOW-UP:  Admit to ICU  Check aspirin test and P2Y12 now  Q1 hr neurochecks  SBP   Continue DAPT DATE OF SERVICE:  11/17/2021 1:34 PM     ATTENDING SURGEON(S):  MD Kaden Torrez MD    ANESTHESIA:   GA    EBL: 10 cc    MEDICATIONS:   See nursing record    PUNCTURE SITE:  Right common femoral artery. Arteriotomy closed with [employer]. Flat with leg straight x 2 hours. Mimi Virk M.D.    Chris Mitchell    , Department of Radiology  [facility]
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
## 2021-11-17 NOTE — ANESTHESIA PROCEDURE NOTES
Arterial Line Placement    Performed by: Alexandria Davies MD  Authorized by: Rajiv Lema DO     Pre-Procedure  Indications:  Arterial pressure monitoring and blood sampling  Preanesthetic Checklist: patient identified, risks and benefits discussed, anesthesia consent, site marked, patient being monitored, timeout performed and patient being monitored      Procedure:   Prep:  ChloraPrep  Seldinger Technique?: Yes    Orientation:  Left  Location:  Radial artery  Catheter size:  20 G  Number of attempts:  1    Assessment:   Post-procedure:  Line secured and sterile dressing applied  Patient Tolerance:  Patient tolerated the procedure well with no immediate complications

## 2021-11-17 NOTE — ANESTHESIA PREPROCEDURE EVALUATION
Relevant Problems   No relevant active problems       Anesthetic History   No history of anesthetic complications            Review of Systems / Medical History  Patient summary reviewed, nursing notes reviewed and pertinent labs reviewed    Pulmonary  Within defined limits                 Neuro/Psych   Within defined limits           Cardiovascular    Hypertension          CAD    Exercise tolerance: >4 METS  Comments: EKG NSR   GI/Hepatic/Renal     GERD           Endo/Other  Within defined limits           Other Findings              Physical Exam    Airway  Mallampati: II  TM Distance: 4 - 6 cm  Neck ROM: normal range of motion   Mouth opening: Normal     Cardiovascular    Rhythm: regular  Rate: normal         Dental    Dentition: Full lower dentures and Full upper dentures     Pulmonary  Breath sounds clear to auscultation               Abdominal  Abdominal exam normal       Other Findings            Anesthetic Plan    ASA: 2  Anesthesia type: general    Monitoring Plan: Arterial line      Induction: Intravenous  Anesthetic plan and risks discussed with: Patient

## 2021-11-17 NOTE — PROGRESS NOTES
TRANSFER - IN REPORT:    Verbal report received from [address] RN(name) on [address]  being received from Angio(unit) for routine post - op      Report consisted of patients Situation, Background, Assessment and   Recommendations(SBAR). Information from the following report(s) SBAR, Kardex, ED Summary, Procedure Summary, Intake/Output, MAR, Recent Results, Med Rec Status, Cardiac Rhythm Sinus Bernadene Mettle, Alarm Parameters , Quality Measures and Dual Neuro Assessment was reviewed with the receiving nurse. Opportunity for questions and clarification was provided. Assessment completed upon patients arrival to unit and care assumed. 1135: Pt arrived to ICU 13.     1150: Dr. Renny Virk at bedside. 1500: Pt with oozing noted on R maico site, pressure held for 5 minutes, pt kept on bedrest, site marked. Kristie Ulrich NP notified of above. 1520: Kristie Ulrich NP at bedside. 1620: Dr. Renny Virk at bedside, groin evaluated, orders received to ambulate pt and remove guillen. Bedside shift change report given to Denise Pantoja (oncoming nurse) by Daisy Muir RN (offgoing nurse). Report included the following information SBAR, Kardex, ED Summary, Procedure Summary, Intake/Output, MAR, Recent Results, Med Rec Status, Cardiac Rhythm NSR, Alarm Parameters , Quality Measures and Dual Neuro Assessment.

## 2021-11-17 NOTE — PROGRESS NOTES
Pt arrives ambulatory to angio department accompanied by  for neuro interventional stent/coiling procedure. All assessments completed and consent was reviewed. Education given was regarding procedure, under anesthesia care, post-procedure care and  management/follow-up. Opportunity for questions was provided and all questions and concerns were addressed. 0830  To table under anesthesia care    [address],[address] 4 with transport to ICU 13. Report to [employer].
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
## 2021-11-18 ENCOUNTER — APPOINTMENT (OUTPATIENT)
Dept: CT IMAGING | Age: 61
DRG: 027 | End: 2021-11-18
Attending: NURSE PRACTITIONER
Payer: COMMERCIAL

## 2021-11-18 VITALS
DIASTOLIC BLOOD PRESSURE: 55 MMHG | HEIGHT: 64 IN | HEART RATE: 63 BPM | SYSTOLIC BLOOD PRESSURE: 118 MMHG | WEIGHT: 114 LBS | BODY MASS INDEX: 19.46 KG/M2 | RESPIRATION RATE: 15 BRPM | OXYGEN SATURATION: 99 % | TEMPERATURE: 98 F

## 2021-11-18 LAB
ANION GAP SERPL CALC-SCNC: 4 MMOL/L (ref 5–15)
BASOPHILS # BLD: 0 K/UL (ref 0–0.1)
BASOPHILS NFR BLD: 0 % (ref 0–1)
BUN SERPL-MCNC: 6 MG/DL (ref 6–20)
BUN/CREAT SERPL: 10 (ref 12–20)
CALCIUM SERPL-MCNC: 8.3 MG/DL (ref 8.5–10.1)
CHLORIDE SERPL-SCNC: 110 MMOL/L (ref 97–108)
CO2 SERPL-SCNC: 24 MMOL/L (ref 21–32)
CREAT SERPL-MCNC: 0.6 MG/DL (ref 0.55–1.02)
DIFFERENTIAL METHOD BLD: ABNORMAL
EOSINOPHIL # BLD: 0 K/UL (ref 0–0.4)
EOSINOPHIL NFR BLD: 0 % (ref 0–7)
ERYTHROCYTE [DISTWIDTH] IN BLOOD BY AUTOMATED COUNT: 16 % (ref 11.5–14.5)
GLUCOSE SERPL-MCNC: 143 MG/DL (ref 65–100)
HCT VFR BLD AUTO: 32.1 % (ref 35–47)
HGB BLD-MCNC: 10.4 G/DL (ref 11.5–16)
IMM GRANULOCYTES # BLD AUTO: 0 K/UL (ref 0–0.04)
IMM GRANULOCYTES NFR BLD AUTO: 0 % (ref 0–0.5)
LYMPHOCYTES # BLD: 1.1 K/UL (ref 0.8–3.5)
LYMPHOCYTES NFR BLD: 13 % (ref 12–49)
MCH RBC QN AUTO: 27.8 PG (ref 26–34)
MCHC RBC AUTO-ENTMCNC: 32.4 G/DL (ref 30–36.5)
MCV RBC AUTO: 85.8 FL (ref 80–99)
MONOCYTES # BLD: 0.4 K/UL (ref 0–1)
MONOCYTES NFR BLD: 4 % (ref 5–13)
NEUTS SEG # BLD: 7 K/UL (ref 1.8–8)
NEUTS SEG NFR BLD: 83 % (ref 32–75)
NRBC # BLD: 0 K/UL (ref 0–0.01)
NRBC BLD-RTO: 0 PER 100 WBC
PLATELET # BLD AUTO: 270 K/UL (ref 150–400)
PMV BLD AUTO: 9.1 FL (ref 8.9–12.9)
POTASSIUM SERPL-SCNC: 3.3 MMOL/L (ref 3.5–5.1)
POTASSIUM SERPL-SCNC: 4.3 MMOL/L (ref 3.5–5.1)
RBC # BLD AUTO: 3.74 M/UL (ref 3.8–5.2)
SODIUM SERPL-SCNC: 138 MMOL/L (ref 136–145)
WBC # BLD AUTO: 8.5 K/UL (ref 3.6–11)

## 2021-11-18 PROCEDURE — 74011250637 HC RX REV CODE- 250/637: Performed by: NURSE PRACTITIONER

## 2021-11-18 PROCEDURE — 85025 COMPLETE CBC W/AUTO DIFF WBC: CPT

## 2021-11-18 PROCEDURE — 99239 HOSP IP/OBS DSCHRG MGMT >30: CPT | Performed by: RADIOLOGY

## 2021-11-18 PROCEDURE — 80048 BASIC METABOLIC PNL TOTAL CA: CPT

## 2021-11-18 PROCEDURE — 70450 CT HEAD/BRAIN W/O DYE: CPT

## 2021-11-18 PROCEDURE — 74011000250 HC RX REV CODE- 250: Performed by: NURSE PRACTITIONER

## 2021-11-18 PROCEDURE — 84132 ASSAY OF SERUM POTASSIUM: CPT

## 2021-11-18 PROCEDURE — 74011250636 HC RX REV CODE- 250/636: Performed by: NURSE PRACTITIONER

## 2021-11-18 PROCEDURE — 36415 COLL VENOUS BLD VENIPUNCTURE: CPT

## 2021-11-18 RX ORDER — POTASSIUM CHLORIDE 750 MG/1
20 TABLET, FILM COATED, EXTENDED RELEASE ORAL
Status: COMPLETED | OUTPATIENT
Start: 2021-11-18 | End: 2021-11-18

## 2021-11-18 RX ORDER — CLOPIDOGREL BISULFATE 75 MG/1
75 TABLET ORAL 2 TIMES DAILY
Qty: 30 TABLET | Refills: 5 | Status: SHIPPED | OUTPATIENT
Start: 2021-11-18 | End: 2021-11-18 | Stop reason: SDUPTHER

## 2021-11-18 RX ORDER — CLOPIDOGREL BISULFATE 75 MG/1
75 TABLET ORAL 2 TIMES DAILY
Qty: 30 TABLET | Refills: 5 | Status: SHIPPED | OUTPATIENT
Start: 2021-11-18

## 2021-11-18 RX ADMIN — CLOPIDOGREL BISULFATE 75 MG: 75 TABLET ORAL at 08:35

## 2021-11-18 RX ADMIN — NICARDIPINE HYDROCHLORIDE 5 MG/HR: 25 INJECTION, SOLUTION INTRAVENOUS at 03:49

## 2021-11-18 RX ADMIN — POTASSIUM CHLORIDE 20 MEQ: 750 TABLET, FILM COATED, EXTENDED RELEASE ORAL at 03:46

## 2021-11-18 RX ADMIN — DULOXETINE HYDROCHLORIDE 20 MG: 20 CAPSULE, DELAYED RELEASE ORAL at 08:36

## 2021-11-18 RX ADMIN — ATORVASTATIN CALCIUM 80 MG: 40 TABLET, FILM COATED ORAL at 08:35

## 2021-11-18 RX ADMIN — ASPIRIN 325 MG ORAL TABLET 325 MG: 325 PILL ORAL at 08:36

## 2021-11-18 RX ADMIN — PANTOPRAZOLE SODIUM 40 MG: 40 TABLET, DELAYED RELEASE ORAL at 08:36

## 2021-11-18 RX ADMIN — POTASSIUM CHLORIDE 20 MEQ: 750 TABLET, FILM COATED, EXTENDED RELEASE ORAL at 06:23

## 2021-11-18 RX ADMIN — METOPROLOL SUCCINATE 25 MG: 25 TABLET, EXTENDED RELEASE ORAL at 08:35

## 2021-11-18 NOTE — PROGRESS NOTES
Neurocritical Care Brief Progress Note:  60-year-old female s/p transarterial coil embolization of the left MCA aneurysm by Dr. Ez Carter on  11/17/21. Physical Exam:  Gen: NAD, calm, cooperative  Neuro: A&Ox4. Follows commands. Speech clear. Affect normal. PERRL, 3 mm bilaterally. Blinks to threat. No disconjugate gaze present. EOMI. Face symmetric. Palate symmetric. Tongue midline. Korina spontaneously. Strength 5/5 in UE and LE BL. Negative drift. Bulk and tone normal. No involuntary movements. Gait deferred. Skin: Warm, dry, color appropriate for ethnicity. Groin arteriotomy site soft and non-tender on palpation, dressing with mild ooze noted with no active bleeding or drainage noted. Positive pedal pulses bilaterally. PLAN:   -Q hourly neuro checks  -SBP goal   -Continue DAPT. P2Y12 139 and aspirin level 419  -Continue statin    Addendum: 3:20 am Potassium low at 3.3. Will give 20 mg q 2 hours x 2 doses. Recheck potassium later this am. Decreased IVF down to 50 ml/hr.      Mango Jolly NP  Neurocritical Care Nurse Practitioner  [phone]

## 2021-11-18 NOTE — PROGRESS NOTES
0730: Bedside and Verbal shift change report given to Liz SOSA RN (oncoming nurse) by Bette Ferrara RN (offgoing nurse). Report included the following information SBAR, Kardex, Intake/Output, MAR, Recent Results and Cardiac Rhythm NSR.

## 2021-11-18 NOTE — PROGRESS NOTES
1930: Bedside shift change report given to Bonifacio Fernandez RN (oncoming nurse) by Burton Tsai RN (offgoing nurse). Report included the following information SBAR, Kardex, Intake/Output, MAR, Recent Results, Cardiac Rhythm NSR, Alarm Parameters  and Dual Neuro Assessment . 2000: Pt A/O x 4. Pupils equal and brisk.  equal and strong, spont movement in all extremities. No complaints of pain. No further oozing noted on groin site; borders marked. No hematoma present. VSS.     0012: Pt to CT. Tolerated well. Transported in wheelchair with monitor and RN x 1.     0034: Pt back on unit. 0315: K+ 3.3. Orders received from Marguerite Munoz NP for oral potassium x 2 doses and recheck potassium at 1000.    0345: SBP >140. Cardene restarted at 5.     0730: Bedside shift change report given to EARNESTINE Hill (oncoming nurse) by Bonifacio Fernandez RN (offgoing nurse). Report included the following information SBAR, Kardex, Intake/Output, MAR, Recent Results, Cardiac Rhythm NSR, Alarm Parameters  and Dual Neuro Assessment .

## 2021-11-18 NOTE — DISCHARGE INSTRUCTIONS
Neurointerventional Surgery Discharge Summary        DATE OF ADMISSION: 11/17/2021     DATE OF DISCHARGE: 11/18/2021     ADMITTING PROVIDER: Zechariah Girard MD    DISCHARGING PHYSICIAN: Lorina Lefort, NP    OFFICE NUMBER: [phone] , you can reach the on call physician 24/7 even during non-business hours     PROCEDURES/SURGERIES: Procedure(s)     * No procedures listed *     [address]:   Cerebral aneurysm [I67.1]  Cerebral aneurysm, nonruptured [I67.1]    Plan to discharge home today, ambulatory and in stable condition. Physical Exam:  GENERAL: alert, cooperative, no distress, appears stated age  EXTREMITIES:  extremities normal, atraumatic, no cyanosis or edema  SKIN: Warm, dry and appropriate for ethnicity. Right groin arteriotomy sight has slight bruising but is soft and without drainage. Dressing removed prior to discharge. Neurologic Exam:  Mental Status:             Alert and oriented x 4. Appropriate affect, mood and behavior. Language:                   Normal fluency, repetition, comprehension and naming. Cranial Nerves:           Pupils equal, round and reactive to light. Visual fields full to confrontation. Extraocular movements intact. Facial sensation intact V1 - V3. Full facial strength, no asymmetry. Hearing intact bilaterally. No dysarthria. Tongue protrudes to midline, palate elevates symmetrically. Shoulder shrug 5/5 bilaterally. Motor:                          No pronator drift. Bulk and tone normal.                                       5/5 power in all extremities proximally and distally.                                       No involuntary movements. Sensation:                   Sensation intact throughout to light touch     Reflexes:                     Deferred     Coordination & Gait:   Normal. FTN and HTS intact. DISCHARGE DIAGNOSES:   Hospital Problems as of 11/18/2021 Date Reviewed: 11/17/2021          Codes Class Noted - Resolved POA    Cerebral aneurysm, nonruptured ICD-10-CM: I67.1  ICD-9-CM: 437.3  11/17/2021 - Present Yes              FOLLOW UP APPOINTMENTS:     Follow-up Information     Follow up With Specialties Details Why Contact Info    Erin Jenkins MD Endovascular Surgical Neuroradiology On 11/22/2021 Please follow up with Dr. Salena Melton for your virtual appointment at [address] [place] [address]  [phone]      [facility] [address] [place] [address]  [address] [address] [phone]             ADDITIONAL CARE RECOMMENDATIONS:   Patient to avoid tub baths, swimming or hot tubs for two weeks. May take showers. Please call 911 and proceed to emergency room for any future difficulties with  balance, vision, weakness in the face, arm or leg, or speech difficulties. DIET: Normal diet     ACTIVITY: Do not lift anything over 10 lbs for two weeks. Try to limit going up and down stairs as much as possible. Rest and hydrate. May resume all physical activities as tolerated after 2 weeks. WOUND CARE: Monitor for signs and sx of infection including fever, expanding inflammation and discolored drainage. Report any changes in temperature in affected extremity, numbness, weakness or hematoma. If you experience any increased bruising or bleeding at your groin puncture site either outside or under the skin please apply direct, firm pressure for 20 minutes. Keep track of the bruising at the groin site by marking it with a pen or marker.  If the bruising continues to be dark purple or blue in color OR is expanding , please call our office to let the on call doctor know. We have someone on call 24/7. DISCHARGE MEDICATIONS:      1. Continue Aspirin 325 mg daily  2. Continue Plavix 75 mg BID    Avoid NSAIDS including (Advil, motrin, ibuprofen, Celebrex etc ) while you are taking aspirin and plavix/Brilinta, you may take Tylenol for pain. Greater than 40 minutes were spent with the patient on counseling and coordination of care.       Signed:  Luis Blas NP  Neurointerventional Surgery

## 2021-11-18 NOTE — DISCHARGE SUMMARY
Neurointerventional Surgery Discharge Summary        DATE OF ADMISSION: 11/17/2021     DATE OF DISCHARGE: 11/18/2021     ADMITTING PROVIDER: Franklin Ribeiro MD    DISCHARGING PHYSICIAN: Dion Patel NP    OFFICE NUMBER: [phone] , you can reach the on call physician 24/7 even during non-business hours     PROCEDURES/SURGERIES: Procedure(s)     * No procedures listed *     [address]:   Cerebral aneurysm [I67.1]  Cerebral aneurysm, nonruptured [I67.1]    Plan to discharge home today, ambulatory and in stable condition. Physical Exam:  GENERAL: alert, cooperative, no distress, appears stated age  EXTREMITIES:  extremities normal, atraumatic, no cyanosis or edema  SKIN: Warm, dry and appropriate for ethnicity. Right groin arteriotomy sight has slight bruising but is soft and without drainage. Dressing removed prior to discharge. Neurologic Exam:  Mental Status:             Alert and oriented x 4. Appropriate affect, mood and behavior. Language:                   Normal fluency, repetition, comprehension and naming. Cranial Nerves:           Pupils equal, round and reactive to light. Visual fields full to confrontation. Extraocular movements intact. Facial sensation intact V1 - V3. Full facial strength, no asymmetry. Hearing intact bilaterally. No dysarthria. Tongue protrudes to midline, palate elevates symmetrically. Shoulder shrug 5/5 bilaterally. Motor:                          No pronator drift. Bulk and tone normal.                                       5/5 power in all extremities proximally and distally.                                       No involuntary movements. Sensation:                   Sensation intact throughout to light touch     Reflexes:                     Deferred     Coordination & Gait:   Normal. FTN and HTS intact. DISCHARGE DIAGNOSES:   Hospital Problems as of 11/18/2021 Date Reviewed: 11/17/2021          Codes Class Noted - Resolved POA    Cerebral aneurysm, nonruptured ICD-10-CM: I67.1  ICD-9-CM: 437.3  11/17/2021 - Present Yes              FOLLOW UP APPOINTMENTS:     Follow-up Information     Follow up With Specialties Details Why Contact Info    Pako Neal MD Endovascular Surgical Neuroradiology On 11/22/2021 Please follow up with Dr. Jhonny Gautam for your virtual appointment at [address] [place] [address]  [phone]      Jenny Trujillo, [address]  [address] [address] [phone]             ADDITIONAL CARE RECOMMENDATIONS:   Patient to avoid tub baths, swimming or hot tubs for two weeks. May take showers. Please call 911 and proceed to emergency room for any future difficulties with  balance, vision, weakness in the face, arm or leg, or speech difficulties. DIET: Normal diet     ACTIVITY: Do not lift anything over 10 lbs for two weeks. Try to limit going up and down stairs as much as possible. Rest and hydrate. May resume all physical activities as tolerated after 2 weeks. WOUND CARE: Monitor for signs and sx of infection including fever, expanding inflammation and discolored drainage. Report any changes in temperature in affected extremity, numbness, weakness or hematoma. If you experience any increased bruising or bleeding at your groin puncture site either outside or under the skin please apply direct, firm pressure for 20 minutes. Keep track of the bruising at the groin site by marking it with a pen or marker.  If the bruising continues to be dark purple or blue in color OR is expanding , please call our office to let the on call doctor know. We have someone on call 24/7. DISCHARGE MEDICATIONS:      1. Continue Aspirin 325 mg daily  2. Continue Plavix 75 mg BID    Avoid NSAIDS including (Advil, motrin, ibuprofen, Celebrex etc ) while you are taking aspirin and plavix/Brilinta, you may take Tylenol for pain. Greater than 40 minutes were spent with the patient on counseling and coordination of care.       Signed:  Luis Blas NP  Neurointerventional Surgery

## 2021-12-10 ENCOUNTER — PATIENT MESSAGE (OUTPATIENT)
Dept: NEUROSURGERY | Age: 61
End: 2021-12-10

## 2021-12-16 ENCOUNTER — TELEPHONE (OUTPATIENT)
Dept: NEUROSURGERY | Age: 61
End: 2021-12-16

## 2021-12-16 NOTE — TELEPHONE ENCOUNTER
Spoke to pharmacist at [facility] regarding refill on plavix. Pharmacist stated plavix needed a prior authorization due to patient taking 2 tabs daily. Request faxed to office today from pharmacy. Prior authorization form with office note faxed to [employer] for prior authorization.

## 2022-01-27 ENCOUNTER — TELEPHONE (OUTPATIENT)
Dept: NEUROSURGERY | Age: 62
End: 2022-01-27

## 2022-03-18 PROBLEM — I67.1 CEREBRAL ANEURYSM, NONRUPTURED: Status: ACTIVE | Noted: 2021-11-17

## 2022-03-19 PROBLEM — I67.1 CEREBRAL ANEURYSM: Status: ACTIVE | Noted: 2021-10-08

## 2022-04-18 DIAGNOSIS — I67.1 CEREBRAL ANEURYSM: ICD-10-CM

## 2022-04-18 RX ORDER — ASPIRIN 325 MG
325 TABLET ORAL DAILY
Qty: 30 TABLET | Refills: 5 | Status: SHIPPED | OUTPATIENT
Start: 2022-04-18 | End: 2022-09-14 | Stop reason: SDUPTHER

## 2022-04-26 ENCOUNTER — TELEPHONE (OUTPATIENT)
Dept: NEUROSURGERY | Age: 62
End: 2022-04-26

## 2022-05-24 ENCOUNTER — TELEPHONE (OUTPATIENT)
Dept: NEUROSURGERY | Age: 62
End: 2022-05-24

## 2022-09-14 DIAGNOSIS — I67.1 CEREBRAL ANEURYSM: ICD-10-CM

## 2022-09-14 RX ORDER — ASPIRIN 325 MG
325 TABLET ORAL DAILY
Qty: 30 TABLET | Refills: 5 | Status: SHIPPED | OUTPATIENT
Start: 2022-09-14

## 2022-10-27 ENCOUNTER — TELEPHONE (OUTPATIENT)
Dept: NEUROSURGERY | Age: 62
End: 2022-10-27

## 2023-03-10 DIAGNOSIS — I67.1 CEREBRAL ANEURYSM: ICD-10-CM

## 2023-03-10 RX ORDER — ASPIRIN 325 MG
325 TABLET ORAL DAILY
Qty: 30 TABLET | Refills: 2 | Status: SHIPPED | OUTPATIENT
Start: 2023-03-10

## 2023-03-14 ENCOUNTER — TELEPHONE (OUTPATIENT)
Dept: NEUROSURGERY | Age: 63
End: 2023-03-14

## 2023-08-25 PROBLEM — D50.9 IRON DEFICIENCY ANEMIA, UNSPECIFIED: Status: ACTIVE | Noted: 2023-08-25

## 2023-08-29 ENCOUNTER — HOSPITAL ENCOUNTER (OUTPATIENT)
Facility: HOSPITAL | Age: 63
Setting detail: INFUSION SERIES
End: 2023-08-29
Payer: COMMERCIAL

## 2023-08-29 VITALS
TEMPERATURE: 98 F | SYSTOLIC BLOOD PRESSURE: 106 MMHG | RESPIRATION RATE: 18 BRPM | DIASTOLIC BLOOD PRESSURE: 75 MMHG | HEART RATE: 71 BPM

## 2023-08-29 DIAGNOSIS — D50.9 IRON DEFICIENCY ANEMIA, UNSPECIFIED IRON DEFICIENCY ANEMIA TYPE: Primary | ICD-10-CM

## 2023-08-29 PROCEDURE — 6360000002 HC RX W HCPCS: Performed by: INTERNAL MEDICINE

## 2023-08-29 PROCEDURE — 2580000003 HC RX 258: Performed by: INTERNAL MEDICINE

## 2023-08-29 PROCEDURE — 96365 THER/PROPH/DIAG IV INF INIT: CPT

## 2023-08-29 RX ORDER — SODIUM CHLORIDE 9 MG/ML
5-250 INJECTION, SOLUTION INTRAVENOUS PRN
Status: CANCELLED | OUTPATIENT
Start: 2023-09-05

## 2023-08-29 RX ORDER — SODIUM CHLORIDE 9 MG/ML
5-250 INJECTION, SOLUTION INTRAVENOUS PRN
Status: DISCONTINUED | OUTPATIENT
Start: 2023-08-29 | End: 2023-08-30 | Stop reason: HOSPADM

## 2023-08-29 RX ADMIN — FERUMOXYTOL 510 MG: 510 INJECTION INTRAVENOUS at 14:20

## 2023-08-29 RX ADMIN — SODIUM CHLORIDE 25 ML/HR: 9 INJECTION, SOLUTION INTRAVENOUS at 13:56

## 2023-09-06 ENCOUNTER — HOSPITAL ENCOUNTER (OUTPATIENT)
Facility: HOSPITAL | Age: 63
Setting detail: INFUSION SERIES
End: 2023-09-06
Payer: COMMERCIAL

## 2023-09-06 VITALS
RESPIRATION RATE: 18 BRPM | HEART RATE: 77 BPM | TEMPERATURE: 98.7 F | DIASTOLIC BLOOD PRESSURE: 67 MMHG | SYSTOLIC BLOOD PRESSURE: 104 MMHG

## 2023-09-06 DIAGNOSIS — D50.9 IRON DEFICIENCY ANEMIA, UNSPECIFIED IRON DEFICIENCY ANEMIA TYPE: Primary | ICD-10-CM

## 2023-09-06 PROCEDURE — 6360000002 HC RX W HCPCS: Performed by: INTERNAL MEDICINE

## 2023-09-06 PROCEDURE — 96365 THER/PROPH/DIAG IV INF INIT: CPT

## 2023-09-06 PROCEDURE — 2580000003 HC RX 258: Performed by: INTERNAL MEDICINE

## 2023-09-06 RX ORDER — SODIUM CHLORIDE 9 MG/ML
5-250 INJECTION, SOLUTION INTRAVENOUS PRN
Status: DISCONTINUED | OUTPATIENT
Start: 2023-09-06 | End: 2023-09-07 | Stop reason: HOSPADM

## 2023-09-06 RX ORDER — SODIUM CHLORIDE 9 MG/ML
5-250 INJECTION, SOLUTION INTRAVENOUS PRN
Status: CANCELLED | OUTPATIENT
Start: 2023-09-12

## 2023-09-06 RX ADMIN — SODIUM CHLORIDE 25 ML/HR: 9 INJECTION, SOLUTION INTRAVENOUS at 13:18

## 2023-09-06 RX ADMIN — FERUMOXYTOL 510 MG: 510 INJECTION INTRAVENOUS at 13:47

## 2023-09-06 ASSESSMENT — PAIN SCALES - GENERAL
PAINLEVEL_OUTOF10: 0
PAINLEVEL_OUTOF10: 0

## 2024-07-01 ENCOUNTER — HOSPITAL ENCOUNTER (OUTPATIENT)
Facility: HOSPITAL | Age: 64
Setting detail: OUTPATIENT SURGERY
Discharge: HOME | End: 2024-07-01
Attending: INTERNAL MEDICINE | Admitting: INTERNAL MEDICINE
Payer: COMMERCIAL

## 2024-07-01 ENCOUNTER — ANESTHESIA (OUTPATIENT)
Facility: HOSPITAL | Age: 64
End: 2024-07-01
Payer: COMMERCIAL

## 2024-07-01 ENCOUNTER — ANESTHESIA EVENT (OUTPATIENT)
Facility: HOSPITAL | Age: 64
End: 2024-07-01
Payer: COMMERCIAL

## 2024-07-01 VITALS
HEIGHT: 64 IN | SYSTOLIC BLOOD PRESSURE: 136 MMHG | TEMPERATURE: 97.7 F | WEIGHT: 86.7 LBS | BODY MASS INDEX: 14.8 KG/M2 | RESPIRATION RATE: 10 BRPM | HEART RATE: 66 BPM | OXYGEN SATURATION: 98 % | DIASTOLIC BLOOD PRESSURE: 75 MMHG

## 2024-07-01 PROCEDURE — 7100000010 HC PHASE II RECOVERY - FIRST 15 MIN: Performed by: INTERNAL MEDICINE

## 2024-07-01 PROCEDURE — 2580000003 HC RX 258: Performed by: INTERNAL MEDICINE

## 2024-07-01 PROCEDURE — 7100000011 HC PHASE II RECOVERY - ADDTL 15 MIN: Performed by: INTERNAL MEDICINE

## 2024-07-01 PROCEDURE — 88305 TISSUE EXAM BY PATHOLOGIST: CPT

## 2024-07-01 PROCEDURE — 3700000001 HC ADD 15 MINUTES (ANESTHESIA): Performed by: INTERNAL MEDICINE

## 2024-07-01 PROCEDURE — 6360000002 HC RX W HCPCS: Performed by: REGISTERED NURSE

## 2024-07-01 PROCEDURE — 2709999900 HC NON-CHARGEABLE SUPPLY: Performed by: INTERNAL MEDICINE

## 2024-07-01 PROCEDURE — 3600007502: Performed by: INTERNAL MEDICINE

## 2024-07-01 PROCEDURE — 3600007512: Performed by: INTERNAL MEDICINE

## 2024-07-01 PROCEDURE — 3700000000 HC ANESTHESIA ATTENDED CARE: Performed by: INTERNAL MEDICINE

## 2024-07-01 PROCEDURE — 88360 TUMOR IMMUNOHISTOCHEM/MANUAL: CPT

## 2024-07-01 PROCEDURE — 2500000003 HC RX 250 WO HCPCS: Performed by: REGISTERED NURSE

## 2024-07-01 RX ORDER — GLYCOPYRROLATE 0.2 MG/ML
INJECTION INTRAMUSCULAR; INTRAVENOUS PRN
Status: DISCONTINUED | OUTPATIENT
Start: 2024-07-01 | End: 2024-07-01 | Stop reason: SDUPTHER

## 2024-07-01 RX ORDER — SIMETHICONE 40MG/0.6ML
40 SUSPENSION, DROPS(FINAL DOSAGE FORM)(ML) ORAL AS NEEDED
Status: DISCONTINUED | OUTPATIENT
Start: 2024-07-01 | End: 2024-07-01 | Stop reason: HOSPADM

## 2024-07-01 RX ORDER — SODIUM CHLORIDE 9 MG/ML
INJECTION, SOLUTION INTRAVENOUS CONTINUOUS
Status: DISCONTINUED | OUTPATIENT
Start: 2024-07-01 | End: 2024-07-01 | Stop reason: HOSPADM

## 2024-07-01 RX ORDER — SODIUM CHLORIDE 0.9 % (FLUSH) 0.9 %
5-40 SYRINGE (ML) INJECTION PRN
Status: DISCONTINUED | OUTPATIENT
Start: 2024-07-01 | End: 2024-07-01 | Stop reason: HOSPADM

## 2024-07-01 RX ADMIN — PROPOFOL 50 MG: 10 INJECTION, EMULSION INTRAVENOUS at 15:20

## 2024-07-01 RX ADMIN — PROPOFOL 20 MG: 10 INJECTION, EMULSION INTRAVENOUS at 15:16

## 2024-07-01 RX ADMIN — PROPOFOL 50 MG: 10 INJECTION, EMULSION INTRAVENOUS at 15:37

## 2024-07-01 RX ADMIN — PROPOFOL 80 MG: 10 INJECTION, EMULSION INTRAVENOUS at 15:14

## 2024-07-01 RX ADMIN — LIDOCAINE HYDROCHLORIDE 40 MG: 20 INJECTION, SOLUTION EPIDURAL; INFILTRATION; INTRACAUDAL; PERINEURAL at 15:14

## 2024-07-01 RX ADMIN — SODIUM CHLORIDE: 9 INJECTION, SOLUTION INTRAVENOUS at 15:09

## 2024-07-01 RX ADMIN — PROPOFOL 50 MG: 10 INJECTION, EMULSION INTRAVENOUS at 15:29

## 2024-07-01 RX ADMIN — PROPOFOL 50 MG: 10 INJECTION, EMULSION INTRAVENOUS at 15:52

## 2024-07-01 RX ADMIN — PROPOFOL 50 MG: 10 INJECTION, EMULSION INTRAVENOUS at 15:33

## 2024-07-01 RX ADMIN — PROPOFOL 50 MG: 10 INJECTION, EMULSION INTRAVENOUS at 15:25

## 2024-07-01 RX ADMIN — PROPOFOL 50 MG: 10 INJECTION, EMULSION INTRAVENOUS at 15:47

## 2024-07-01 RX ADMIN — GLYCOPYRROLATE 0.1 MG: 0.2 INJECTION, SOLUTION INTRAMUSCULAR; INTRAVENOUS at 15:14

## 2024-07-01 RX ADMIN — PROPOFOL 50 MG: 10 INJECTION, EMULSION INTRAVENOUS at 15:42

## 2024-07-01 ASSESSMENT — PAIN - FUNCTIONAL ASSESSMENT: PAIN_FUNCTIONAL_ASSESSMENT: 0-10

## 2024-07-01 ASSESSMENT — LIFESTYLE VARIABLES: SMOKING_STATUS: 1

## 2024-07-01 NOTE — DISCHARGE INSTRUCTIONS
Shauna Houston  [id number]  1960    COLON/EGD DISCHARGE INSTRUCTIONS  Discomfort:  Redness at IV site- apply warm compress to area; if redness or soreness persist- contact your physician  There may be a slight amount of blood passed from the rectum  Gaseous discomfort- walking, belching will help relieve any discomfort  Sore throat- throat lozenges or warm salt water gargle  You may not operate a vehicle for 12 hours  You may not engage in an occupation involving machinery or appliances for rest of today  You may not drink alcoholic beverages for at least 12 hours  Avoid making any critical decisions for at least 24 hour  DIET:   Low residue diet   - however -  remember your colon is empty and a heavy meal will produce gas.   Avoid these foods:  vegetables, fried / greasy foods, carbonated drinks for today.    MEDICATIONS:      Regarding Aspirin or Nonsteroidal medications, please see below.    ACTIVITY:  You may resume your normal daily activities it is recommended that you spend the remainder of the day resting -  avoid any strenuous activity.    CALL M.D.  ANY SIGN OF:  Increasing pain, nausea, vomiting  Abdominal distension (swelling)  New increased bleeding (oral or rectal)  Fever (chills)  Pain in chest area  Bloody discharge from nose or mouth  Shortness of breath    The use of some over-the-counter pain medication may lead to bleeding after biopsies or other procedures you may have had done.  Tylenol (also called acetaminophen) is safe to take and will not lead to bleeding.  For the next 7 days use only Tylenol for pain.  Do not take any medicines containing ibuprofen, naproxen or aspirin during that time.      Impressions:  EGD:  Erosive gastritis, 3 cm hiatal hernia, gastric and duodenal bx taken  Colonoscopy: Ulcerated, 5-7 cm mass in the rectosigmoid colon, 10 cm from the anus, biopsies taken to confirm neoplasia, Tattoo placed DISTAL to the lesion, 6-8 mm descending colon polyp s/p cold snare

## 2024-07-01 NOTE — PROGRESS NOTES
ARRIVAL INFORMATION:  Verified patient name and date of birth, scheduled procedure, and informed consent.     : Lee contact number: [phone]  Physician and staff can share information with the .     Belongings with patient include:  Clothing,None    GI FOCUSED ASSESSMENT:  Neuro: Awake, alert, oriented x4  Respiratory: even and unlabored   GI: soft and non-distended  EKG Rhythm: normal sinus rhythm    Education:Reviewed general discharge instructions and  information.     The risks and benefits of the bite block have been explained to patient.  Patient verbalizes understanding.

## 2024-07-01 NOTE — OP NOTE
hiatal hernia. Hill valve grade II.  Stomach: There is erosive gastritis in the antrum, otherwise normal. Cold forceps biopsies were taken from antrum, incisura, lesser and greater curvature to r/o H pylori.   Duodenum/jejunum: Normal mucosa and vascular pattern, biopsied cold forceps to r/o celiac disease        ----------Colonoscopy-----------    Procedure in Detail:  After obtaining informed consent, positioning of the patient in the left lateral decubitus position, and conduction of a pre-procedure pause or \"time out\" the endoscope was introduced into the anus and advanced to the terminal ileum.  The quality of the colonic preparation was good.  A careful inspection was made as the colonoscope was withdrawn, findings and interventions are described below.    Findings:  -Normal terminal ileum  -There is an ulcerated near half circumferential mass in the rectosigmoid colon, 10 cm from the anus, friable, easily bleeding, approximately 5-7 cm in length, able to pass pediatric colon scope; multiple cold forceps biopsies were taken to confirm neoplasia. Spot ink Tattoo is placed distal to the lesion. There is some patchy erythema in the sigmoid colon, proximal to the lesion site.  -There is a single sessile 6 to 8 mm polyp in the descending colon, removed by cold snare and retrieved completely.  -Retroflexion in the rectum was not performed.  Internal hemorrhoids noted    ------------------------------  Specimens:    See above    Complications:   None; patient tolerated the procedure well.    Impressions:  EGD:  Erosive gastritis, 3 cm hiatal hernia, gastric and duodenal bx taken  Colonoscopy: Ulcerated, 5-7 cm mass in the rectosigmoid colon, 10 cm from the anus, biopsies taken to confirm neoplasia, Tattoo placed DISTAL to the lesion, 6-8 mm descending colon polyp s/p cold snare resection      Recommendations:   - Await pathology results  - Resume low residue diet  - Avoid NSAIDs  - Start PPI 40 mg QD  - Refer to  colorectal surgeon once rectosigmoid lesion biopsy confirm neoplasia    Thank you for entrusting me with this patient's care.  Please do not hesitate to contact me with any questions or if I can be of assistance with any of your other patients' GI needs.    Signed By: Mike Horvath MD                        July 1, 2024    Surgical assistant none.  Implants none unless specified.

## 2024-07-01 NOTE — ANESTHESIA POSTPROCEDURE EVALUATION
Department of Anesthesiology  Postprocedure Note    Patient: Shauna Houston  MRN: [id number]  YOB: 1960  Date of evaluation: 7/1/2024    Procedure Summary       Date: 07/01/24 Room / Location: [facility] ENDO 01 / [facility] ENDOSCOPY    Anesthesia Start: 1507 Anesthesia Stop: 1604    Procedures:       ESOPHAGOGASTRODUODENOSCOPY (Upper GI Region)      COLONOSCOPY DIAGNOSTIC (Lower GI Region) Diagnosis:       Occult blood positive stool      (Occult blood positive stool [R19.5])    Surgeons: Mike Horvath MD Responsible Provider: Kevin Olivares MD    Anesthesia Type: MAC ASA Status: 3            Anesthesia Type: MAC    Crissy Phase I: Crissy Score: 10    Crissy Phase II: Crissy Score: 10    Anesthesia Post Evaluation    Patient location during evaluation: bedside  Patient participation: complete - patient participated  Level of consciousness: awake  Pain score: 0  Airway patency: patent  Nausea & Vomiting: no nausea and no vomiting  Cardiovascular status: hemodynamically stable  Respiratory status: acceptable  Hydration status: euvolemic  Pain management: adequate    No notable events documented.

## 2024-07-01 NOTE — ANESTHESIA PRE PROCEDURE
any of the following reactions from taking penicillin.    No significant reaction to PCN    NO HOSPITALIZATION REQUIRED AS A CHILD.       Problem List:    Patient Active Problem List   Diagnosis Code   • Cerebral aneurysm, nonruptured I67.1   • Cerebral aneurysm I67.1   • Iron deficiency anemia, unspecified D50.9       Past Medical History:        Diagnosis Date   • Anemia    • Aneurysm (HCC) 2021   • CAD (coronary artery disease)    • GERD (gastroesophageal reflux disease)    • Hypertension        Past Surgical History:        Procedure Laterality Date   • APPENDECTOMY     • HERNIA REPAIR     • [place] UNLISTED PROCEDURE CARDIAC SURGERY  Sept 2019    stent   • TONSILLECTOMY     • TOTAL HIP ARTHROPLASTY Left    • TUBAL LIGATION         Social History:    Social History     Tobacco Use   • Smoking status: Every Day     Current packs/day: 0.50     Types: Cigarettes   • Smokeless tobacco: Never   Substance Use Topics   • Alcohol use: Yes     Alcohol/week: 2.0 standard drinks of alcohol                                Ready to quit: Not Answered  Counseling given: Not Answered      Vital Signs (Current): There were no vitals filed for this visit.                                           BP Readings from Last 3 Encounters:   09/06/23 104/67   08/29/23 106/75       NPO Status:                                                                                 BMI:   Wt Readings from Last 3 Encounters:   No data found for Wt     There is no height or weight on file to calculate BMI.    CBC:   Lab Results   Component Value Date/Time    WBC 8.5 11/18/2021 01:55 AM    RBC 3.74 11/18/2021 01:55 AM    HGB 10.4 11/18/2021 01:55 AM    HCT 32.1 11/18/2021 01:55 AM    MCV 85.8 11/18/2021 01:55 AM    RDW 16.0 11/18/2021 01:55 AM     11/18/2021 01:55 AM       CMP:   Lab Results   Component Value Date/Time     11/18/2021 01:55 AM    K 4.3 11/18/2021 10:06 AM     11/18/2021 01:55 AM    CO2 24 11/18/2021 01:55 AM    BUN 6

## 2024-07-01 NOTE — H&P
[facility]  Outpatient History and Physical    Patient: Shauna ADAM Houston    Physician: Mike Horvath MD    Vital Signs: Blood pressure 129/76, pulse 73, temperature 97.8 °F (36.6 °C), resp. rate 20, height 1.626 m (5' 4\"), weight 39.3 kg (86 lb 11.2 oz), SpO2 100 %.    Allergies:   Allergies   Allergen Reactions    Latex Hives     AND BLISTERS    Penicillins      Other reaction(s): Unknown (comments)  When taking penicillin did you have any of the following:      Patient denies any of the following reactions from taking penicillin.    No significant reaction to PCN    NO HOSPITALIZATION REQUIRED AS A CHILD.       Chief Complaint: Unintentional weight loss, intermittent diarrhea, iron def anemia, no prior scopes    History of Present Illness: Unintentional weight loss, intermittent diarrhea, iron def anemia, no prior scopes    Indication for Procedure: Unintentional weight loss, intermittent diarrhea, iron def anemia, no prior scopes    History:  Past Medical History:   Diagnosis Date    Anemia     Aneurysm (HCC) 2021    CAD (coronary artery disease)     GERD (gastroesophageal reflux disease)     Hypertension       Past Surgical History:   Procedure Laterality Date    APPENDECTOMY      HERNIA REPAIR      [place] UNLISTED PROCEDURE CARDIAC SURGERY  Sept 2019    stent    TONSILLECTOMY      TOTAL HIP ARTHROPLASTY Left     TUBAL LIGATION        Social History     Socioeconomic History    Marital status:      Spouse name: None    Number of children: None    Years of education: None    Highest education level: None   Tobacco Use    Smoking status: Every Day     Current packs/day: 0.50     Types: Cigarettes    Smokeless tobacco: Never   Substance and Sexual Activity    Alcohol use: Yes     Alcohol/week: 2.0 standard drinks of alcohol    Drug use: Yes     Types: Marijuana (Weed)      Family History   Problem Relation Age of Onset    Dementia Mother     No Known Problems Father     No

## 2024-07-12 ENCOUNTER — TRANSCRIBE ORDERS (OUTPATIENT)
Facility: HOSPITAL | Age: 64
End: 2024-07-12

## 2024-07-12 DIAGNOSIS — C19 PRIMARY ADENOCARCINOMA OF RECTOSIGMOID JUNCTION (HCC): Primary | ICD-10-CM

## 2024-07-22 ENCOUNTER — CLINICAL DOCUMENTATION (OUTPATIENT)
Facility: HOSPITAL | Age: 64
End: 2024-07-22

## 2024-07-22 ENCOUNTER — HOSPITAL ENCOUNTER (OUTPATIENT)
Facility: HOSPITAL | Age: 64
Discharge: HOME | End: 2024-07-25
Attending: INTERNAL MEDICINE
Payer: COMMERCIAL

## 2024-07-22 ENCOUNTER — TELEPHONE (OUTPATIENT)
Facility: HOSPITAL | Age: 64
End: 2024-07-22

## 2024-07-22 DIAGNOSIS — C20 RECTAL CANCER (HCC): Primary | ICD-10-CM

## 2024-07-22 DIAGNOSIS — C19 PRIMARY ADENOCARCINOMA OF RECTOSIGMOID JUNCTION (HCC): ICD-10-CM

## 2024-07-22 PROCEDURE — 6360000004 HC RX CONTRAST MEDICATION: Performed by: INTERNAL MEDICINE

## 2024-07-22 PROCEDURE — 74177 CT ABD & PELVIS W/CONTRAST: CPT

## 2024-07-22 RX ADMIN — IOPAMIDOL 100 ML: 755 INJECTION, SOLUTION INTRAVENOUS at 15:34

## 2024-07-22 NOTE — TELEPHONE ENCOUNTER
[facility]  Colorectal Oncology Nurse Navigator Encounter    Name:    Shauna Houston  Age:    63 y.o.    Interdisciplinary Team:  Surgeon: Dr. Merchant     Nurse Navigator:  Teena Hood RN.      Encounter type:  []Patient Initiated  []Navigator Follow-up []Pre-op  []Post-op  []Check-in Prior to First Treatment []Treatment Modality Change  [x]Initial Navigator Encounter []Other:       Narrative:                   Received referral from Dr. Merchant to help get STAT MRI pelvis scheduled for Shauna.   Called and spoke to Shauna and introduced myself and role and plan to get MRI.    Attempted scheduling with multiple locations to get her seen today or tomorrow morning as she was currently getting CT at [facility] and lives 2.5 hours away, but no location was available.     Coordinated with Suad Lenz with scheduling and July 30th at 8:45 am was the earliest available at [facility]. Was unable to get back in touch with [facility], but Bianca SANDOVAL was able to assist with confirming the booking.   Dr. Merchant made aware of appointment date and approved.    Spoke again to Shauna after her CT and gave time/date/location and instructions for MRI. Encouraged her to call me for any questions/concerns/support she may need.                 Teena Hood RN.  Colorectal Oncology Nurse Navigator   [address]  [address]  Office: [phone]  Office Cell: [phone]  Fax: [phone]  [email or web address]  Good Help to Those in Need®

## 2024-07-22 NOTE — PROGRESS NOTES
[facility]  Breast Navigator Encounter    Name:    Shauna Houston  Age:    63 y.o.  Diagnosis:    Rectosigmoid adenocarcinoma    Called [facility] at [phone] and spoke to EDDY Raya.  She will schedule the patient for her MRI of the pelvis on 7/30/2024 at [facility] at 8:45 am with arrival time of 8:15 am.  There are no special instructions other than removing metal items.      I scheduled this for LORI Dickinson because she was on hold on another line.                             Mally Gregory RN, BSN, [facility]  Oncology Breast Navigator     [facility]  [address] [place]  [address]  W: [phone]  F: [phone]  [email or web address]  Good Help to Those in Need®

## 2024-07-30 ENCOUNTER — HOSPITAL ENCOUNTER (OUTPATIENT)
Facility: HOSPITAL | Age: 64
Discharge: HOME | End: 2024-08-02
Attending: STUDENT IN AN ORGANIZED HEALTH CARE EDUCATION/TRAINING PROGRAM
Payer: COMMERCIAL

## 2024-07-30 DIAGNOSIS — C20 RECTAL CANCER (HCC): ICD-10-CM

## 2024-07-30 PROCEDURE — 72197 MRI PELVIS W/O & W/DYE: CPT

## 2024-07-30 PROCEDURE — A9579 GAD-BASE MR CONTRAST NOS,1ML: HCPCS | Performed by: STUDENT IN AN ORGANIZED HEALTH CARE EDUCATION/TRAINING PROGRAM

## 2024-07-30 PROCEDURE — 6360000004 HC RX CONTRAST MEDICATION: Performed by: STUDENT IN AN ORGANIZED HEALTH CARE EDUCATION/TRAINING PROGRAM

## 2024-07-30 RX ADMIN — GADOTERIDOL 8 ML: 279.3 INJECTION, SOLUTION INTRAVENOUS at 09:45

## 2024-08-07 ENCOUNTER — TELEPHONE (OUTPATIENT)
Facility: HOSPITAL | Age: 64
End: 2024-08-07

## 2024-08-07 DIAGNOSIS — C20 RECTAL CANCER (HCC): Primary | ICD-10-CM

## 2024-08-09 ENCOUNTER — TELEPHONE (OUTPATIENT)
Facility: HOSPITAL | Age: 64
End: 2024-08-09

## 2024-08-09 NOTE — TELEPHONE ENCOUNTER
[facility]  Colorectal Oncology Nurse Navigator Encounter    Name:    Shauna Houston  Age:    63 y.o.  Diagnosis: Rectosigmoid adenocarcinoma      Narrative:  Spoke with Shauna to discuss Radiation oncology referral and coordinating care, verified ID x2. Received permission to start referral process for either [facility] or [facility], which every is easier/closer. Called [facility] in [facility], but they do not have Radiation Oncology at that location, next closest is [facility].     Awaiting to hear back from Maryana at [facility] office regarding if they take Shauna's insurance. Went ahead and scheduled consult for their first available Monday 8/26 with arrival of 1:30 pm. Spoke with Dr. Merchant and Shauna's chart to be faxed over to [phone]. Will follow up with Maryana on Monday to verify they accept insurance and have received referral/chart and are able to get images power-shared over.     Spoke with Taylor Huddleston RN at Dr. Arevalo's office regarding moving Shauna's consult from 8/26 to 8/23 and will follow up on this Monday 8/12.     Discussed the formerly [facility] center and free services/support they offer that she was interested in. Encouraged Shauna to call for any questions/concerns/support and that I would follow back up with her on Monday.             Teena Hood RN.  Colorectal Oncology Nurse Navigator   [address]  [address]  Office: [phone]   Office Cell: [phone]  Fax:[phone]  [email or web address]  Good Help to Those in Need®

## 2024-08-09 NOTE — TELEPHONE ENCOUNTER
[facility]  Colorectal Oncology Nurse Navigator Encounter    Name:    Shauna Houston  Age:    63 y.o.  Diagnosis: Rectosimoid adenocarcinoma    Narrative:  Called and LVM 8/8/2024 regarding need to set up Radiation oncology consult closer to where Ms. Houston lives in [place] and my help in arranging referral and records to be sent prior to appointment. Will continue to follow-up.          Teena Hood RN.  Colorectal Oncology Nurse Navigator   [place] [id number]  Office: [phone]   Office Cell: [phone]  Fax:[phone]  [email or web address]  Good Help to Those in Need®

## 2024-08-16 ENCOUNTER — TELEPHONE (OUTPATIENT)
Facility: HOSPITAL | Age: 64
End: 2024-08-16

## 2024-08-16 NOTE — TELEPHONE ENCOUNTER
[facility]  Colorectal Oncology Nurse Navigator Encounter    Name:    Shauna Houston  Age:    63 y.o.  Colorectal Surgeon: Dr. Merchant  Medical Oncologist: Dr. Arevalo  Diagnosis: Rectal adenocarcinoma      Narrative:  Have spoken with Maryana at [facility] multiple times this week awaiting confirmation on insurance coverage. Maryana called to confirm that patients [employer] insurance is not in-network and will only cover 6 visits/year with $75 copay. I called [phone] and spoke to a representative with [place] and confirmed the above and was given the wrong number to call [employer] for in-network providers. Was finally able to get the number of [phone] for [employer] who was able to send me an entire list of Radiation oncologists that are in-network.     Called and spoke to Shauna about the above, including applying for financial assistance. She is understanding of the process and grateful for the help. Updated Dr. Merchant that referral will need to be sent out again with patients chart to new Radiation Oncologist once I can find one within reasonable distance from her home that can see her as soon as possible.              Teena Hood RN.  Colorectal Oncology Nurse Navigator   [facility]  [address] [place]  [address]  Office: [phone]   Office Cell: [phone]  Fax:[phone]  [email or web address]  Good Help to Those in Need®

## 2024-08-22 NOTE — PROGRESS NOTES
Shauna Houston is a 63 y.o. female here for new patient appt for rectal cancer.  Referred by Mally Merchant  7/1/24 EGD/Colonoscopy  Pt has gained 4 lbs recently. Pt was 114 lbs and has been losing weight over past 1 1/2 years.   Denies any pain.     1. Have you been to the ER, urgent care clinic since your last visit?  Hospitalized since your last visit?  New Pt    2. Have you seen or consulted any other health care providers outside of the [facility] since your last visit?  Include any pap smears or colon screening.  New Pt

## 2024-08-23 ENCOUNTER — OFFICE VISIT (OUTPATIENT)
Age: 64
End: 2024-08-23
Payer: COMMERCIAL

## 2024-08-23 ENCOUNTER — CLINICAL DOCUMENTATION (OUTPATIENT)
Age: 64
End: 2024-08-23

## 2024-08-23 ENCOUNTER — ENROLLMENT (OUTPATIENT)
Age: 64
End: 2024-08-23

## 2024-08-23 VITALS
BODY MASS INDEX: 15.03 KG/M2 | HEART RATE: 77 BPM | SYSTOLIC BLOOD PRESSURE: 97 MMHG | DIASTOLIC BLOOD PRESSURE: 56 MMHG | WEIGHT: 88 LBS | TEMPERATURE: 98 F | HEIGHT: 64 IN | OXYGEN SATURATION: 97 %

## 2024-08-23 DIAGNOSIS — E43 SEVERE PROTEIN-CALORIE MALNUTRITION (GOMEZ: LESS THAN 60% OF STANDARD WEIGHT) (HCC): ICD-10-CM

## 2024-08-23 DIAGNOSIS — C20 RECTAL CANCER (HCC): Primary | ICD-10-CM

## 2024-08-23 PROCEDURE — 99205 OFFICE O/P NEW HI 60 MIN: CPT | Performed by: INTERNAL MEDICINE

## 2024-08-23 NOTE — PROGRESS NOTES
Oral Chemotherapy Management Program      Shauna Houston is a 63 y.o.female seen for initial consultation for pharmacist oral chemotherapy management.     Diagnosis:  Rectal Cancer    Medication name: Cepacitabine  Dose: 800mg   Frequency: Twice daily (M-F with radiation) x 6 weeks    Ordering provider: Mickey     Patient was accompanied her .  Dose and schedule of the prescribed therapy were discussed  Some of the side effects discussed include, but are not limited to, bone marrow suppression, nausea/vomiting, hand foot syndrome, diarrhea, and mucositis. Patient demonstrated comprehension and understanding throughout the education session. A packet containing the information provided to the patient. Prescribed medication was reviewed for appropriate indication and dosing. Medication list was reviewed for potential drug interactions.    Supportive care medications prescribed: Jelly FieldD, BCOP   For Pharmacy Admin Tracking Only    Program: Medical Group  CPA in place:  No  Recommendation Provided To: Patient/Caregiver: 3 via In person  Intervention Detail: New Rx: 2, reason: Needs Additional Therapy  Intervention Accepted By: Patient/Caregiver: 1   Time Spent (min): 10

## 2024-08-23 NOTE — PROGRESS NOTES
[facility]   Psychosocial Assessment    [x] Med-Onc [facility] [] Med-Onc [facility] [] Med-Onc [facility] [] Rad-Onc [facility] [] Rad-Onc [facility] [] Rad-Onc [facility] [] Rad-Onc [facility] [] [facility]       Patient: Shauna Houston    Reason for Assessment:    [] Social Work Referral  [x] Initial Assessment  [x] New Diagnosis  [] Other:     Advance Care Planning:  [] AMD Discussed and Completed  [] AMD Reviewed Verbally [] AMD Copy Provided  [x] Conversation Deferred [] Conversation Declined      Sources of Information:    [x] Patient  [x] Family  [x] Staff  [x] Medical Record    Mental Status:    [x] Alert  [] Lethargic  [] Unresponsive  [] Unable to assess   Oriented to: [x] Person  [x] Place  [x] Time  [x] Situation      Relationship Status:  [] Single  [x]   [] Significant Other/Life Partner  []   []   []     Living Circumstances:  [] Lives Alone  [x] Family/Significant Other in Household  [] Roommates  [] Children in the Home  [] Paid Caregivers  [] Assisted Living Facility/Group Home  [] Skilled Nursing Facility  [] Homeless  [] Incarcerated  [] Environmental/Care Concerns  [] Other:    Employment Status:   [] Employed Full-time  [] 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  [] Retired  [] Short-Term Disability  [] Long-Term Disability  [] Unemployed  [] SSI  [] SSDI  [] Self-Employment    Transportation Resources:   [x] Self  [x] Family/Friends  [] Insurance  [] Community Programs  [] Other:    Barriers to Learning:    [] Language  [] Developmental  [] Cognitive  [] Altered Mental Status  [] Visual/Hearing Impairment  [] Unable to Read/Write  [] Motivational  [] Challenges Understanding Medical Jargon [x] No Barriers Identified      Financial/Legal Concerns:    [] Uninsured  [] Limited Income/Resources  [] Non-Citizen  [] Food Insecurity [x] No Concerns Identified   [] Other:    Worship/Spiritual/Existential:   Does patient have any spiritual or Buddhist beliefs? [] Yes [] No  Is the patient

## 2024-08-23 NOTE — PROGRESS NOTES
[facility] [place]  [facility]  [address], [address]  [place] [address]  [phone]        Hematology Oncology Note        Patient: Shauna Houston MRN: [id number]  SSN: [id number]    YOB: 1960  Age: 63 y.o.  Sex: female      Subjective:      Shauna Houston is a 63 y.o. female who underwent a colonoscopy for weight loss, diarrhea and iron def anemia. She has been losing weight for over 6 months. Labs showed iron def anemia. The colonoscopy revealed an ulcerated mass in the recto-sigmoid colon. A biopsy revealed adenocarcinoma. The MRI of the pelvis shows the tumor penetrates the serosal lining. She was seen by Dr. Merchant. She now lives in [place] and comes to see me for consideration of concurrent chemotherapy with radiation. She has seen radiation oncologist at [facility].       Review of Systems:  Constitutional: positive for fatigue and weight loss  Eyes: negative  Ears, Nose, Mouth, Throat, and Face: negative  Respiratory: negative  Cardiovascular: negative  Gastrointestinal: negative  Genitourinary:negative  Integument/Breast: negative  Hematologic/Lymphatic: negative  Musculoskeletal:negative  Neurological: negative    Past Medical History:   Diagnosis Date    Anemia     Aneurysm (HCC) 2021    CAD (coronary artery disease)     GERD (gastroesophageal reflux disease)     Hypertension     Rectal cancer (HCC) 08/2024     Past Surgical History:   Procedure Laterality Date    APPENDECTOMY      COLONOSCOPY N/A 7/1/2024    COLONOSCOPY DIAGNOSTIC performed by Mike Horvath MD at hospitals ENDOSCOPY    HERNIA REPAIR      SC UNLISTED PROCEDURE CARDIAC SURGERY  Sept 2019    stent    TONSILLECTOMY      TOTAL HIP ARTHROPLASTY Left     TUBAL LIGATION      UPPER GASTROINTESTINAL ENDOSCOPY N/A 7/1/2024    ESOPHAGOGASTRODUODENOSCOPY performed by Mike Horvath MD at hospitals ENDOSCOPY      Family History

## 2024-08-26 RX ORDER — ONDANSETRON 8 MG/1
8 TABLET, ORALLY DISINTEGRATING ORAL EVERY 8 HOURS PRN
Qty: 30 TABLET | Refills: 2 | Status: SHIPPED | OUTPATIENT
Start: 2024-08-26

## 2024-08-26 RX ORDER — CAPECITABINE 500 MG/1
1000 TABLET, FILM COATED ORAL 2 TIMES DAILY
Qty: 120 TABLET | Refills: 0 | Status: ACTIVE | OUTPATIENT
Start: 2024-08-26 | End: 2024-09-25

## 2024-08-28 ENCOUNTER — TELEPHONE (OUTPATIENT)
Age: 64
End: 2024-08-28

## 2024-08-28 ENCOUNTER — CLINICAL DOCUMENTATION (OUTPATIENT)
Age: 64
End: 2024-08-28

## 2024-08-28 NOTE — TELEPHONE ENCOUNTER
Called pt to inquire if she has her xeloda yet.  [facility] will [facility]  She will let us know when radiation is starting so we can see her virtually the same day.

## 2024-08-28 NOTE — PROGRESS NOTES
NCCN Distress Thermometer    Date Screening Completed: 08/23/24    Screening Declined:  [] Yes    Number that best describes how much distress you've experienced in the past week, including today?  0 [] - No distress 1 []      2 []      3 [x]      4 []       5 []       6 []      7 []      8 []      9 []       10 [] - Extreme distress    PROBLEM LIST  Have you had concerns about any of the items below in the past week, including today?      Physical Concerns Practical Concerns   [] Pain [] Taking care of myself    [x] Sleep [] Taking care of others    [x] Fatigue [] Work   [x] Tobacco use  [] School   [] Substance use  [] Housing   [] Memory or concentration [] Finances   [] Sexual health [] Insurance   [x] Changes in eating  [] Transportation   [] Loss or change of physical abilities  []     [] Having enough food   Emotional Concerns [] Access to medicine   [] Worry or anxiety [] Treatment decisions   [] Sadness or depression    [] Loss of interest or enjoyment  Spiritual or Protestant Concerns   [] Grief or loss  [] Sense of meaning or purpose   [] Fear [] Changes in jana or beliefs   [] Loneliness  [] Death, dying, or afterlife   [] Anger [] Conflict between beliefs and cancer treatments    [] Changes in appearance [] Relationship with the sacred   [] Feelings of worthlessness or being a burden [] Ritual or dietary needs        Social Concerns     [] Relationship with spouse or partner     [] Relationship with children    [] Relationship with family members     [] Relationship with friends or coworkers     [] Communication with health care team     [] Ability to have children     [] Prejudice or discrimination        Other Concerns:     Patient received resource information and education:  [x] Yes  [] No

## 2024-08-30 ENCOUNTER — TELEPHONE (OUTPATIENT)
Age: 64
End: 2024-08-30

## 2024-08-30 NOTE — TELEPHONE ENCOUNTER
[facility] [place] [facility]   [address] [place] [address]   W: [phone]  F: [phone]      Medical Nutrition Therapy  Nutrition Encounter:      Referral received.  Patient with rectal cancer. Called and spoke with patient, explained that RD is available to address nutrition throughout the spectrum of care.  She has had some weight loss over the past few months. Reports eating ok.  Does have bouts of diarrhea and constipation. We discussed importance of nutrition, eating based on time/schedule verses hunger cues, utilization of supplements as needed and management of potential side effects.  She is appreciative of information and encouraged her to reach out to discuss side effects impact ability to eat.  She will be receiving capecitabine with concurrent radiation.         Ht Readings from Last 1 Encounters:   08/23/24 1.626 m (5' 4\")       Wt Readings from Last 5 Encounters:   08/23/24 39.9 kg (88 lb)   07/01/24 39.3 kg (86 lb 11.2 oz)         Estimated Nutrition Needs:   Calorie Range: 1200-1400kcal/day      Protein Range: 40-50g/day     Fluid Needs: 1500ml     Plan:  - RD to follow and be a resource        Signed By: LANEY FORDE RD

## 2024-09-05 ENCOUNTER — CLINICAL DOCUMENTATION (OUTPATIENT)
Age: 64
End: 2024-09-05

## 2024-09-05 NOTE — PROGRESS NOTES
Oral Chemotherapy Management Program    Shauna Houston is a 63 y.o.female who spoke with pharmacist oral chemotherapy management.     Diagnosis: Rectal cancer    Medication name: Capecitabine  Dose: 1000 mg   Frequency: Twice daily    Ordering provider: Mickey    Assessment/Plan    Patient has started oral chemotherapy with radiation this week. She reports she is tolerating well with  no nausea so far. Still having recurrent bouts of diarrhea all day. She is eating but everything goes through her. She has Childrens Imodium but is reluctant to try it. She was encouraged to try as discussed with doctor during her visit.   MD will see patient tomorrow for virtual visit where she can discuss further.          Current Medications    Current Outpatient Medications   Medication Sig Dispense Refill    capecitabine (XELODA) 500 MG chemo tablet Take 2 tablets (1,000 mg total) by mouth 2 times daily for 60 doses M-F with radiation treatments 120 tablet 0    ondansetron (ZOFRAN-ODT) 8 MG TBDP disintegrating tablet Place 1 tablet under the tongue every 8 hours as needed for Nausea or Vomiting 30 tablet 2    NITROGLYCERIN PO Take by mouth      aspirin 325 MG tablet Take 1 tablet by mouth daily      atorvastatin (LIPITOR) 80 MG tablet Take 1 tablet by mouth daily      clopidogrel (PLAVIX) 75 MG tablet Take 1 tablet by mouth 2 times daily      DULoxetine (CYMBALTA) 20 MG extended release capsule Take 1 capsule by mouth daily (Patient not taking: Reported on 8/23/2024)      metoprolol succinate (TOPROL XL) 25 MG extended release tablet Take 1 tablet by mouth daily      pantoprazole (PROTONIX) 40 MG tablet Take 1 tablet by mouth daily       No current facility-administered medications for this visit.            Beth Cruz PharmD, [facility]   For Pharmacy Admin Tracking Only    Program: Medical Group  CPA in place:  No  Recommendation Provided To: Patient/Caregiver: 1 via Telephone   Time Spent (min): 5

## 2024-09-09 ENCOUNTER — TELEMEDICINE (OUTPATIENT)
Age: 64
End: 2024-09-09
Payer: COMMERCIAL

## 2024-09-09 DIAGNOSIS — E43 SEVERE PROTEIN-CALORIE MALNUTRITION (GOMEZ: LESS THAN 60% OF STANDARD WEIGHT) (HCC): ICD-10-CM

## 2024-09-09 DIAGNOSIS — Z51.11 ENCOUNTER FOR CHEMOTHERAPY MANAGEMENT: ICD-10-CM

## 2024-09-09 DIAGNOSIS — C20 RECTAL CANCER (HCC): Primary | ICD-10-CM

## 2024-09-09 DIAGNOSIS — C19 RECTOSIGMOID CANCER (HCC): ICD-10-CM

## 2024-09-09 PROCEDURE — 99214 OFFICE O/P EST MOD 30 MIN: CPT | Performed by: INTERNAL MEDICINE

## 2024-09-10 DIAGNOSIS — C20 RECTAL CANCER (HCC): Primary | ICD-10-CM

## 2024-09-11 ENCOUNTER — TELEPHONE (OUTPATIENT)
Age: 64
End: 2024-09-11

## 2024-09-11 NOTE — TELEPHONE ENCOUNTER
Called because pt is have excessive diarrhea  Stated that pt has lost 4 more lbs, and is now weighing 84 lbs. Stated that today they have drawn CBC, BMP, and Magnesium. Inquired about if pt still needs lab drawn on Monday as well.

## 2024-09-11 NOTE — TELEPHONE ENCOUNTER
Called pt.  HIPAA verified by two patient identifiers.   Explained I was calling to follow up on a call I received from Rad Onc and asked if she is taking imodium for diarrhea.  She said yes. I asked how is she taking it. And how often is she having diarrhea. She replied \" I am not in a good mood today, can we talk about this tomorrow?\" I stressed I wanted to help her feel better however pt continued to request to talk about it tomorrow as she is very upset. I asked her to call me tomorrow.      Called Rad Onc to relay the above information and advise we will still like labs on Monday as we need iron studies.  Unable to leave VM.

## 2024-09-12 NOTE — TELEPHONE ENCOUNTER
Called pt. No answer.  Asked her to send mychart if that is better for her.    I called Rad Onc. Spoke with Reina.  Reina did report that pt was pretty upset yesterday over her weight dropping and she said how everyone tells her to eat but it goes through her and she tried imodium before but it causes constipation for her.  Reina suggested she take childrens imodium but pt has not done so yet.    Reina will be out for next 2 weeks but she has passed information along to get labs done on Monday as well as iron profile and ferritin.    Case d/w Pharmacist.  During visit  told her about childrens imodium as well during visit and when Beth spoke with pt she also asked her to take childrens imodium and pt  said she hasn't taken it.

## 2024-09-12 NOTE — TELEPHONE ENCOUNTER
Per Princess PSR, pt returned call but I was unable to take it at the time.    Will call pt back before end of the day.

## 2024-09-16 NOTE — TELEPHONE ENCOUNTER
Please see if pt can do a virtual visit this wed at 1:15pm for 1 week follow up.  Will also have lab results by then as well.

## 2024-09-18 ENCOUNTER — TELEMEDICINE (OUTPATIENT)
Age: 64
End: 2024-09-18
Payer: COMMERCIAL

## 2024-09-18 DIAGNOSIS — Z51.11 ENCOUNTER FOR CHEMOTHERAPY MANAGEMENT: ICD-10-CM

## 2024-09-18 DIAGNOSIS — C20 RECTAL CANCER (HCC): Primary | ICD-10-CM

## 2024-09-18 DIAGNOSIS — R19.7 DIARRHEA, UNSPECIFIED TYPE: ICD-10-CM

## 2024-09-18 DIAGNOSIS — E43 SEVERE PROTEIN-CALORIE MALNUTRITION (GOMEZ: LESS THAN 60% OF STANDARD WEIGHT) (HCC): ICD-10-CM

## 2024-09-18 PROCEDURE — 99214 OFFICE O/P EST MOD 30 MIN: CPT | Performed by: INTERNAL MEDICINE

## 2024-09-18 RX ORDER — OMEPRAZOLE 40 MG/1
40 CAPSULE, DELAYED RELEASE ORAL EVERY MORNING
COMMUNITY
Start: 2024-07-01

## 2024-09-18 RX ORDER — DULOXETIN HYDROCHLORIDE 30 MG/1
30 CAPSULE, DELAYED RELEASE ORAL DAILY
Qty: 90 CAPSULE | Refills: 1 | Status: SHIPPED | OUTPATIENT
Start: 2024-09-18

## 2024-09-20 ENCOUNTER — TELEPHONE (OUTPATIENT)
Age: 64
End: 2024-09-20

## 2024-09-26 ENCOUNTER — TELEMEDICINE (OUTPATIENT)
Age: 64
End: 2024-09-26
Payer: COMMERCIAL

## 2024-09-26 DIAGNOSIS — E43 SEVERE PROTEIN-CALORIE MALNUTRITION (GOMEZ: LESS THAN 60% OF STANDARD WEIGHT) (HCC): ICD-10-CM

## 2024-09-26 DIAGNOSIS — Z51.11 ENCOUNTER FOR CHEMOTHERAPY MANAGEMENT: ICD-10-CM

## 2024-09-26 DIAGNOSIS — C20 RECTAL CANCER (HCC): Primary | ICD-10-CM

## 2024-09-26 PROCEDURE — 99214 OFFICE O/P EST MOD 30 MIN: CPT | Performed by: INTERNAL MEDICINE

## 2024-09-27 ENCOUNTER — TELEPHONE (OUTPATIENT)
Facility: HOSPITAL | Age: 64
End: 2024-09-27

## 2024-10-09 ENCOUNTER — TELEPHONE (OUTPATIENT)
Age: 64
End: 2024-10-09

## 2024-10-09 NOTE — TELEPHONE ENCOUNTER
Received call from Radiation Oncology- [facility].  Pt is getting radiation today.  Complains of diarrhea every day. (Pt hesistant to take imodium)  BP 79/58 today.    She has 2 more tx's after today.    Case d/w MD. Obtain cbc with diff cmp mg and phosp and 1 liter of NS IV Bolus and we will see her virtually today at 315.

## 2024-10-09 NOTE — TELEPHONE ENCOUNTER
Reina returned call to the office to alert us that pt reported to her that she has some chest pain.  Rad Onc MD sent pt to ER (so we will be cancelling her virtual appointment this afternoon)    Reina will send us her lab results.

## 2024-10-10 ENCOUNTER — TELEPHONE (OUTPATIENT)
Age: 64
End: 2024-10-10

## 2024-10-10 NOTE — TELEPHONE ENCOUNTER
Called pt  HIPAA verified by two patient identifiers.   She has been admitted to hospital for chest pain, dehydration, low potassium and low magnesium.    She asked I call rad onc to cancel her appt today. Called Reina at this time.  She asked if she is to continue xeloda.  Per , no more xeloda.  Pt aware and will call us when she is out of the hospital. She was wondering when she is to resume it and if resume radiation, I reiterated for her to call our office after discharge and this can be discussed at that time.

## 2024-10-14 ENCOUNTER — TELEMEDICINE (OUTPATIENT)
Age: 64
End: 2024-10-14
Payer: COMMERCIAL

## 2024-10-14 ENCOUNTER — TELEPHONE (OUTPATIENT)
Age: 64
End: 2024-10-14

## 2024-10-14 DIAGNOSIS — C20 RECTAL CANCER (HCC): Primary | ICD-10-CM

## 2024-10-14 PROCEDURE — 99214 OFFICE O/P EST MOD 30 MIN: CPT | Performed by: INTERNAL MEDICINE

## 2024-10-14 NOTE — PROGRESS NOTES
Pt discharged late afternoon Friday.  Pt prescribed lomotil and cefdinir for UTI.  Pt says she is starting to feel a little better.  Having 3-4 during the day and at night, and not on there as much as used to be.      1. Have you been to the ER, urgent care clinic since your last visit?  Hospitalized [facility]? [facility]10[facility].    2. Have you seen or consulted any other health care providers outside of the [facility] since your last visit?  Include any pap smears or colon screening.  [facility]        
structures)    Structures with possible invasion: (if applicable)  : none  Pelvic sidewall: none invasion of the left posterior lateral pelvic sidewall  Pelvic floor: none  Sacrum: None  Vessels: none  Nerves: none  FOR LOW RECTAL TUMORS: Involvement of anal sphincters: No.    EXTRAMURAL VENOUS INVASION (EMVI): Evaluation is limited    CIRCUMFERENTIAL RESECTION MARGIN: [For T3 Tumor Only]  Shortest distance of tumor to MRF or anticipated CRM:  Dilated  Separate tumor deposit, suspicious lymph node or EMVI threatening (< 2 mm) or  invading (<1 mm) the MRF): None (If yes, location and distance]    MESORECTAL LYMPH NODES (superior rectal and mesorectal only) AND TUMOR DEPOSITS:  Right mesorectal fascia lymph node measuring 5 mm series 7 image 10. Evaluation  is significantly limited by some motion artifact on the postcontrast images.    JANIS node present/absent  If absent; Most superior suspicious lymph node/deposit is located: None  visualized  EXTRA-MESORECAL LYMPH NODES [AJCC 7thed: Locoregional: internal iliac,obturator.  Non- locoregional (M1): external iliac, common iliac, retroperitoneal, inguinal]    Any suspicious extra-TME/ pelvic sidewall nodes? None definitely visualized. If  yes, location:    Total number of lymph nodes: 1  Total number of suspicious nodes: 1  OTHER: [Left hip arthroplasty    Impression  Category:  T   T4b* (tumor invades or adherent to adjacent organs or structures)    N  N+ (short axis >= 9 mm)    CRM: [involved:  tumor within 1 mm    Sphincter involvement:  absent    The lesion is at or above the anterior peritoneal reflection and is more  consistent with a low colon cancer. There is transmural extension of tumor into  the left pelvic sidewall. Evaluation for nodes is limited by extensive motion.  Consider CT imaging with contrast.    Electronically signed by Abel Cisneros    I personally reviewed the MRI. The tumor invades the serosa and involves regional Lns.       External labs

## 2024-10-14 NOTE — TELEPHONE ENCOUNTER
Pt's  called in reference to his wife'e VV today I pushed it down to 11:30 [place] they are currently in route.

## 2024-10-18 ENCOUNTER — CLINICAL DOCUMENTATION (OUTPATIENT)
Age: 64
End: 2024-10-18

## 2024-11-22 ENCOUNTER — TELEPHONE (OUTPATIENT)
Facility: HOSPITAL | Age: 64
End: 2024-11-22

## 2024-11-22 NOTE — TELEPHONE ENCOUNTER
[facility]  Colorectal Oncology Nurse Navigator Encounter    Name:    Shauna Houston  Age:    64 y.o.    Narrative:  Called and LVM for Mrs. Houston regarding following up with oncologist, Dr. Gomez, and getting new referral for colorectal surgeon closer to home in [place], as she did not have a good experience with first referral. We had previously discussed possible need to get her seen back with Dr. Merchant.          Teena Hood RN.  Colorectal Oncology Nurse Navigator   [place] [id number]  Office: [phone]   Office Cell: [phone]  Fax:[phone]  [email or web address]  Good Help to Those in Need®

## 2025-03-19 RX ORDER — DULOXETIN HYDROCHLORIDE 30 MG/1
CAPSULE, DELAYED RELEASE ORAL DAILY
Qty: 90 CAPSULE | Refills: 1 | OUTPATIENT
Start: 2025-03-19

## (undated) DEVICE — IV START KIT: Brand: MEDLINE

## (undated) DEVICE — SET GRAV CK VLV NEEDLESS ST 3 GANGED 4WAY STPCOCK HI FLO 10

## (undated) DEVICE — BITEBLOCK 54FR W/ DENT RIM BLOX

## (undated) DEVICE — ELECTRODE PT RET AD L9FT HI MOIST COND ADH HYDRGEL CORDED

## (undated) DEVICE — SNARE ENDOSCP POLYP MED STD AD 2.4X27X240 CM 2.8 MM OVL SENS

## (undated) DEVICE — CONTAINER SPEC 20 ML LID NEUT BUFF FORMALIN 10 % POLYPR STS

## (undated) DEVICE — COVIDIEN KENDALL DL DISPOSABLE 3 LEAD SY: Brand: MEDLINE RENEWAL

## (undated) DEVICE — FORCEPS BX L240CM JAW DIA2.8MM L CAP W/ NDL MIC MESH TOOTH

## (undated) DEVICE — ENDOSCOPIC KIT COMPLIANCE ENDOKIT

## (undated) DEVICE — CUFF BLD PRSS AD CLTH SGL TB W/ BAYNT CONN ROUNDED CORNER

## (undated) DEVICE — NEEDLE SCLERO 25GA L240CM OD0.51MM ID0.24MM EXTN L4MM SHTH

## (undated) DEVICE — CATHETER IV 20GA L1.16IN OD1.0414-1.1176MM ID0.762-0.8382MM

## (undated) DEVICE — TRAP SPEC POLYP REM STRNR CLN DSGN MAGNIFYING WIND DISP

## (undated) DEVICE — TIP SUCT TRNSPAR RIB SURF STD BLB RIG NVENT W/ 5IN1 CONN DYND50138] MEDLINE INDUSTRIES INC]

## (undated) DEVICE — TRAP ENDOSCP POLYP 2 CHMBR DRAWER TYP